# Patient Record
Sex: FEMALE | Race: WHITE | Employment: OTHER | URBAN - METROPOLITAN AREA
[De-identification: names, ages, dates, MRNs, and addresses within clinical notes are randomized per-mention and may not be internally consistent; named-entity substitution may affect disease eponyms.]

---

## 2017-12-12 ENCOUNTER — PAT TELEPHONE (OUTPATIENT)
Dept: PREADMISSION TESTING | Age: 52
End: 2017-12-12

## 2017-12-12 VITALS — BODY MASS INDEX: 21.69 KG/M2 | HEIGHT: 66 IN | WEIGHT: 135 LBS

## 2017-12-12 RX ORDER — LEVOTHYROXINE SODIUM 112 UG/1
112 TABLET ORAL DAILY
COMMUNITY
End: 2020-07-08

## 2017-12-12 RX ORDER — LIOTHYRONINE SODIUM 5 UG/1
5 TABLET ORAL DAILY
COMMUNITY
End: 2020-06-16

## 2017-12-12 RX ORDER — ACETAMINOPHEN 325 MG/1
650 TABLET ORAL EVERY 6 HOURS PRN
COMMUNITY
End: 2021-10-25

## 2017-12-12 RX ORDER — PSEUDOEPHEDRINE HCL 30 MG/5 ML
15 LIQUID (ML) ORAL 4 TIMES DAILY PRN
COMMUNITY
End: 2021-10-25

## 2017-12-12 RX ORDER — FEXOFENADINE HCL 180 MG/1
180 TABLET ORAL DAILY
COMMUNITY
End: 2020-06-16

## 2017-12-12 RX ORDER — TRAMADOL HYDROCHLORIDE 50 MG/1
50 TABLET ORAL EVERY 6 HOURS PRN
COMMUNITY

## 2017-12-19 ENCOUNTER — HOSPITAL ENCOUNTER (OUTPATIENT)
Dept: ENDOSCOPY | Age: 52
Discharge: HOME | End: 2017-12-19
Attending: INTERNAL MEDICINE | Admitting: INTERNAL MEDICINE

## 2017-12-19 VITALS
WEIGHT: 139.13 LBS | OXYGEN SATURATION: 100 % | RESPIRATION RATE: 16 BRPM | SYSTOLIC BLOOD PRESSURE: 125 MMHG | TEMPERATURE: 98.1 F | BODY MASS INDEX: 22.46 KG/M2 | HEART RATE: 77 BPM | DIASTOLIC BLOOD PRESSURE: 74 MMHG

## 2017-12-19 RX ORDER — SODIUM CHLORIDE 0.9 % (FLUSH) 0.9 %
10 SYRINGE (ML) INJECTION EVERY 12 HOURS SCHEDULED
Status: DISCONTINUED | OUTPATIENT
Start: 2017-12-19 | End: 2017-12-20 | Stop reason: HOSPADM

## 2017-12-19 RX ORDER — SODIUM CHLORIDE 9 MG/ML
INJECTION, SOLUTION INTRAVENOUS CONTINUOUS
Status: DISCONTINUED | OUTPATIENT
Start: 2017-12-19 | End: 2017-12-20 | Stop reason: HOSPADM

## 2017-12-19 RX ORDER — SODIUM CHLORIDE 0.9 % (FLUSH) 0.9 %
10 SYRINGE (ML) INJECTION PRN
Status: DISCONTINUED | OUTPATIENT
Start: 2017-12-19 | End: 2017-12-20 | Stop reason: HOSPADM

## 2017-12-19 ASSESSMENT — PAIN - FUNCTIONAL ASSESSMENT: PAIN_FUNCTIONAL_ASSESSMENT: 0-10

## 2017-12-19 ASSESSMENT — LIFESTYLE VARIABLES: SMOKING_STATUS: 0

## 2017-12-19 ASSESSMENT — PAIN SCALES - GENERAL
PAINLEVEL_OUTOF10: 0

## 2017-12-19 ASSESSMENT — ENCOUNTER SYMPTOMS: SHORTNESS OF BREATH: 0

## 2017-12-19 NOTE — ANESTHESIA PRE-OP
[facility] Department of Anesthesiology  Pre-Anesthesia Evaluation/Consultation       Name:  Jeanne Barksdale  : 1965  Age:  50 y.o. MRN:  [id number]  Date: 2017           Procedure (Scheduled):  colonoscopy  Surgeon:  Dr. Odessia Dancer     There is no problem list on file for this patient. Past Medical History:   Diagnosis Date    Asthma     Seasonal allergies     Thyroid disease      Past Surgical History:   Procedure Laterality Date    DILATION AND CURETTAGE OF UTERUS      SINUS SURGERY      X 2    WISDOM TOOTH EXTRACTION       Social History   Substance Use Topics    Smoking status: Never Smoker    Smokeless tobacco: Never Used    Alcohol use No     Medications  Current Outpatient Prescriptions on File Prior to Encounter   Medication Sig Dispense Refill    fexofenadine (ALLEGRA ALLERGY) 180 MG tablet Take 180 mg by mouth daily      pseudoephedrine (SUDAFED) 30 MG/5ML syrup Take 15 mg by mouth 4 times daily as needed      levothyroxine (LEVOXYL) 112 MCG tablet Take 112 mcg by mouth Daily      liothyronine (CYTOMEL) 5 MCG tablet Take 5 mcg by mouth daily      traMADol (ULTRAM) 50 MG tablet Take 50 mg by mouth every 6 hours as needed for Pain .  acetaminophen (TYLENOL) 325 MG tablet Take 650 mg by mouth every 6 hours as needed for Pain       No current facility-administered medications on file prior to encounter. Current Outpatient Prescriptions   Medication Sig Dispense Refill    fexofenadine (ALLEGRA ALLERGY) 180 MG tablet Take 180 mg by mouth daily      pseudoephedrine (SUDAFED) 30 MG/5ML syrup Take 15 mg by mouth 4 times daily as needed      levothyroxine (LEVOXYL) 112 MCG tablet Take 112 mcg by mouth Daily      liothyronine (CYTOMEL) 5 MCG tablet Take 5 mcg by mouth daily      traMADol (ULTRAM) 50 MG tablet Take 50 mg by mouth every 6 hours as needed for Pain .  acetaminophen (TYLENOL) 325 MG tablet Take 650 mg by mouth every 6 hours as needed for Pain       Current Facility-Administered Medications   Medication Dose Route Frequency Provider Last Rate Last Dose    0.9 % sodium chloride infusion   Intravenous Continuous Karen Vance MD        sodium chloride flush 0.9 % injection 10 mL  10 mL Intravenous 2 times per day Karen Vance MD        sodium chloride flush 0.9 % injection 10 mL  10 mL Intravenous PRN Karen Vance MD         Vital Signs (Current)   Vitals:    17   BP: 136/84   Pulse: 82   Resp: 16   Temp: 98.9 °F (37.2 °C)   SpO2: 100%     Vital Signs Statistics (for past 48 hrs)     Temp  Av.9 °F (37.2 °C)  Min: 98.9 °F (37.2 °C)   Min taken time: 17  Max: 98.9 °F (37.2 °C)   Max taken time: 17  Pulse  Av  Min: 82   Min taken time: 17  Max: 82   Max taken time: 17  Resp  Av  Min: 16   Min taken time: 17  Max: 16   Max taken time: 17  BP  Min: 136/84   Min taken time: 17  Max: 136/84   Max taken time: 17  SpO2  Av %  Min: 100 %   Min taken time: 17  Max: 100 %   Max taken time: 17    BP Readings from Last 3 Encounters:   17 136/84     BMI  Body mass index is 22.46 kg/m². Estimated body mass index is 22.46 kg/m² as calculated from the following:    Height as of 17: 5' 6\" (1.676 m). Weight as of this encounter: 139 lb 2 oz (63.1 kg). CBC No results found for: WBC, RBC, HGB, HCT, MCV, RDW, PLT  CMP  No results found for: NA, K, CL, CO2, BUN, CREATININE, GFRAA, AGRATIO, LABGLOM, GLUCOSE, PROT, CALCIUM, BILITOT, ALKPHOS, AST, ALT  BMP  No results found for: NA, K, CL, CO2, BUN, CREATININE, CALCIUM, GFRAA, LABGLOM, GLUCOSE  POCGlucose  No results for input(s): GLUCOSE in the last 72 hours.    Coags  No results found for: PROTIME, INR, APTT  HCG (If Applicable) No results found for: PREGTESTUR, PREGSERUM, HCG, HCGQUANT   ABGs No results found for: PHART, PO2ART, FFU8XZP, QWL2HFC, BEART, G4LLFFRH   Type & Screen (If Applicable)  No results found for: LABABO, LABRH                         BMI: Wt Readings from Last 3 Encounters:       NPO Status:   Date of last liquid consumption: 12/19/17   Time of last liquid consumption: 0750 (sip w meds)   Date of last solid food consumption: 12/17/17      Time of last solid consumption: 0000       Anesthesia Evaluation  Patient summary reviewed and Nursing notes reviewed no history of anesthetic complications:   Airway: Mallampati: II  TM distance: >3 FB   Neck ROM: full  Mouth opening: > = 3 FB Dental: normal exam         Pulmonary:normal exam  breath sounds clear to auscultation  (+) asthma:     (-) pneumonia, COPD, shortness of breath, recent URI, sleep apnea, rhonchi and not a current smoker                           Cardiovascular:  Exercise tolerance: good (>4 METS),       (-) hypertension, past MI, CABG/stent,  angina, orthopnea and murmur      Rhythm: regular  Rate: normal           Beta Blocker:  Not on Beta Blocker         Neuro/Psych:   Negative Neuro/Psych ROS     (-) seizures, neuromuscular disease, TIA, CVA, headaches, psychiatric history and depression/anxiety            GI/Hepatic/Renal:   (+) bowel prep,      (-) hiatal hernia, GERD, PUD, hepatitis, liver disease, no renal disease and no morbid obesity       Endo/Other: Negative Endo/Other ROS   (+) hypothyroidism::., .    (-) hyperthyroidism, blood dyscrasia, arthritis, no Type II DM, no Type I DM               Abdominal:         (-) obese Abdomen: soft. Vascular: negative vascular ROS. - PVD, DVT and PE. Anesthesia Plan      MAC     ASA 2       Induction: intravenous. MIPS: Prophylactic antiemetics administered. Anesthetic plan and risks discussed with patient. Plan discussed with CRNA.                   This pre-anesthesia assessment may be

## 2017-12-19 NOTE — H&P
136/84   Pulse 82   Temp 98.9 °F (37.2 °C) (Temporal)   Resp 16   Wt 139 lb 2 oz (63.1 kg)   SpO2 100%   BMI 22.46 kg/m²  I        Heart: Normal    Lungs: Normal    Abdomen: Normal      ASA Grade: ASA 2 - Patient with mild systemic disease with no functional limitations    II (soft palate, uvula, fauces visible)  ASSESSMENT AND PLAN:    1. Patient is a 50 y.o. female here for Colonoscopy  2. Procedure options, risks and benefits reviewed with patient who expresses understanding.

## 2020-06-16 ENCOUNTER — OFFICE VISIT (OUTPATIENT)
Dept: ENDOCRINOLOGY | Age: 55
End: 2020-06-16
Payer: COMMERCIAL

## 2020-06-16 VITALS
WEIGHT: 144 LBS | RESPIRATION RATE: 16 BRPM | DIASTOLIC BLOOD PRESSURE: 71 MMHG | BODY MASS INDEX: 22.6 KG/M2 | TEMPERATURE: 97.7 F | HEIGHT: 67 IN | HEART RATE: 73 BPM | SYSTOLIC BLOOD PRESSURE: 108 MMHG

## 2020-06-16 PROBLEM — G89.4 CHRONIC PAIN SYNDROME: Status: ACTIVE | Noted: 2020-06-16

## 2020-06-16 PROBLEM — E05.00 GRAVES DISEASE: Status: ACTIVE | Noted: 2020-06-16

## 2020-06-16 PROBLEM — E89.0 POSTABLATIVE HYPOTHYROIDISM: Status: ACTIVE | Noted: 2020-06-16

## 2020-06-16 PROBLEM — Z78.0 POSTMENOPAUSAL: Status: ACTIVE | Noted: 2020-06-16

## 2020-06-16 PROCEDURE — 99203 OFFICE O/P NEW LOW 30 MIN: CPT | Performed by: INTERNAL MEDICINE

## 2020-06-16 NOTE — PROGRESS NOTES
WISDOM TOOTH EXTRACTION       Family History   Problem Relation Age of Onset    High Blood Pressure Mother     Atrial Fibrillation Mother     High Blood Pressure Father     Cancer Father         URETER    Colon Cancer Paternal Grandfather      Social History     Socioeconomic History    Marital status:      Spouse name: None    Number of children: None    Years of education: None    Highest education level: None   Occupational History    None   Social Needs    Financial resource strain: None    Food insecurity     Worry: None     Inability: None    Transportation needs     Medical: None     Non-medical: None   Tobacco Use    Smoking status: Never Smoker    Smokeless tobacco: Never Used   Substance and Sexual Activity    Alcohol use: No    Drug use: Never    Sexual activity: None   Lifestyle    Physical activity     Days per week: None     Minutes per session: None    Stress: None   Relationships    Social connections     Talks on phone: None     Gets together: None     Attends Orthodox service: None     Active member of club or organization: None     Attends meetings of clubs or organizations: None     Relationship status: None    Intimate partner violence     Fear of current or ex partner: None     Emotionally abused: None     Physically abused: None     Forced sexual activity: None   Other Topics Concern    None   Social History Narrative    None     Current Outpatient Medications   Medication Sig Dispense Refill    Cetirizine HCl (ZYRTEC PO) Take by mouth      pseudoephedrine (SUDAFED) 30 MG/5ML syrup Take 15 mg by mouth 4 times daily as needed      levothyroxine (LEVOXYL) 112 MCG tablet Take 112 mcg by mouth Daily      traMADol (ULTRAM) 50 MG tablet Take 50 mg by mouth every 6 hours as needed for Pain .  acetaminophen (TYLENOL) 325 MG tablet Take 650 mg by mouth every 6 hours as needed for Pain       No current facility-administered medications for this visit. Allergies   Allergen Reactions    Nsaids Shortness Of Breath    Triamcinolone Anaphylaxis     Allergic to preservative in [employer]           Review of Systems:  I have reviewed the review of system questionnaire filled by the patient . Patient was advised to contact PCP for non endocrine signs and symptoms       OBJECTIVE:   /71   Pulse 73   Temp 97.7 °F (36.5 °C) (Oral)   Resp 16   Ht 5' 6.5\" (1.689 m)   Wt 144 lb (65.3 kg)   BMI 22.89 kg/m²   Wt Readings from Last 3 Encounters:   06/16/20 144 lb (65.3 kg)   12/19/17 139 lb 2 oz (63.1 kg)   12/12/17 135 lb (61.2 kg)         Physical Exam:  Constitutional: no acute distress, well appearing, well nourished  Psychiatric: oriented to person, place and time, judgement, insight and normal, recent and remote memory and intact and mood, affect are normal  Skin: skin and subcutaneous tissue is normal without mass, normal turgor  Head and Face: examination of head and face revealed no abnormalities  Eyes: no lid or conjunctival swelling, no erythema or discharge, pupils are normal, equal, round. Ears/Nose: external inspection of ears and nose revealed no abnormalities, hearing is grossly normal  Oropharynx/Mouth/Face: lips, tongue and gums are normal with no lesions, the voice quality was normal  Neck: neck is supple and symmetric, with midline trachea and no masses, thyroid is normal  Lymphatics: normal cervical lymph nodes, normal supraclavicular nodes  Pulmonary: no increased work of breathing or signs of respiratory distress, lungs are clear to auscultation  Cardiovascular: normal heart rate and rhythm, normal S1 and S2, no murmurs   Abdomen: abdomen is soft, non-tender with no masses  Musculoskeletal: normal gait and station.   Neurological: normal coordination, normal general cortical function    Lab Review:  No results found for: TSH  No results found for: T4FREE     ASSESSMENT/PLAN:  1. Graves disease status post radioactive iodine ablation at and complications of disease if inadequately treated, side effects of medications, diagnosis, treatment options, and prognosis, risks, benefits, complications, and alternatives of treatment, labs, imaging and other studies and treatment targets and goals. She understands instructions and counseling. Total time spent 40 minutes , more than 50 % if this time was spent on face to face counseling. Return in about 3 months (around 9/16/2020). Please note that some or all of this report was generated using voice recognition software. Please notify me in case of any questions about the content of this document, as some errors in transcription may have occurred .

## 2020-07-02 DIAGNOSIS — E05.00 GRAVES DISEASE: ICD-10-CM

## 2020-07-02 DIAGNOSIS — Z78.0 POSTMENOPAUSAL: ICD-10-CM

## 2020-07-02 LAB
ANTI-THYROGLOB ABS: <10 IU/ML
ESTRADIOL LEVEL: <5 PG/ML
FOLLICLE STIMULATING HORMONE: 82.8 MIU/ML
LUTEINIZING HORMONE: 36.5 MIU/ML
T3 TOTAL: 1.09 NG/ML (ref 0.8–2)
T4 FREE: 1.6 NG/DL (ref 0.9–1.8)
THYROID PEROXIDASE (TPO) ABS: 18 IU/ML
TSH SERPL DL<=0.05 MIU/L-ACNC: 0.09 UIU/ML (ref 0.27–4.2)
VITAMIN D 25-HYDROXY: 21.3 NG/ML

## 2020-07-04 LAB — TSH RECEPTOR AB: <0.9 IU/L

## 2020-07-08 ENCOUNTER — VIRTUAL VISIT (OUTPATIENT)
Dept: ENDOCRINOLOGY | Age: 55
End: 2020-07-08
Payer: COMMERCIAL

## 2020-07-08 PROCEDURE — 99213 OFFICE O/P EST LOW 20 MIN: CPT | Performed by: INTERNAL MEDICINE

## 2020-07-08 RX ORDER — LEVOTHYROXINE SODIUM 100 UG/1
100 TABLET ORAL DAILY
Qty: 90 TABLET | Refills: 1 | Status: SHIPPED | OUTPATIENT
Start: 2020-07-08 | End: 2020-11-24 | Stop reason: SDUPTHER

## 2020-07-08 NOTE — PROGRESS NOTES
Procedure Laterality Date    COLONOSCOPY  12/19/2017    Zander, normal    DILATION AND CURETTAGE OF UTERUS      SINUS SURGERY      X 2    WISDOM TOOTH EXTRACTION       Family History   Problem Relation Age of Onset    High Blood Pressure Mother     Atrial Fibrillation Mother     High Blood Pressure Father     Cancer Father         URETER    Colon Cancer Paternal Grandfather      Social History     Socioeconomic History    Marital status:      Spouse name: None    Number of children: None    Years of education: None    Highest education level: None   Occupational History    None   Social Needs    Financial resource strain: None    Food insecurity     Worry: None     Inability: None    Transportation needs     Medical: None     Non-medical: None   Tobacco Use    Smoking status: Never Smoker    Smokeless tobacco: Never Used   Substance and Sexual Activity    Alcohol use: No    Drug use: Never    Sexual activity: None   Lifestyle    Physical activity     Days per week: None     Minutes per session: None    Stress: None   Relationships    Social connections     Talks on phone: None     Gets together: None     Attends Sabianist service: None     Active member of club or organization: None     Attends meetings of clubs or organizations: None     Relationship status: None    Intimate partner violence     Fear of current or ex partner: None     Emotionally abused: None     Physically abused: None     Forced sexual activity: None   Other Topics Concern    None   Social History Narrative    None     Current Outpatient Medications   Medication Sig Dispense Refill    LEVOXYL 100 MCG tablet Take 1 tablet by mouth Daily 90 tablet 1    Cetirizine HCl (ZYRTEC PO) Take by mouth      pseudoephedrine (SUDAFED) 30 MG/5ML syrup Take 15 mg by mouth 4 times daily as needed      traMADol (ULTRAM) 50 MG tablet Take 50 mg by mouth every 6 hours as needed for Pain .       acetaminophen (TYLENOL) 325 has noted worsening anxiety in the last few years and she is also noted to have a suppressed TSH over the last few readings most recent TSH is suppressed at 0.09 on July 2020, her free T4 was  1.6 but she did not take Levoxyl on the day of blood work  --She is on on 112 mcg of Levoxyl since TSH is suppressed I advised her to reduce the dose to 88 mcg daily she would like to make a smaller change so will reduce the dose 200 mcg Levoxyl daily she will have repeat labs in 2 months and then in 4 month    Side effects from over replacement of thyroid hormone was discussed in great detail with the patient. She was needing thyroid hormone replacement soon after her radioactive iodine ablation. Initially she was started on Levoxyl and was later on switched to [place] [facility] for 2 years and then switch back to Levoxyl and Cytomel. Since December 2019 she is stopped taking the Cytomel is and is only on 112 mcg of Levoxyl. She did not notice significant change in her symptoms after stopping Cytomel and is not interested in resuming it. 3. Chronic pain syndrome  Patient is being treated for chronic pain syndrome, she also carries the diagnosis of myofascial pain issues and follows with pain management physician and is on tramadol    4.  Postmenopausal  According to the patient her last menstrual cycle was at age 47  She notes some symptoms of menopause in the last 1 or 2 years  She has strong family history of osteoporosis including her mom dad and her brother who has celiac disease  I have ordered the following  She still has not done her DEXA scan  Blood work shows postmenopausal status      Reviewed and/or ordered clinical lab results Yes  Reviewed and/or ordered radiology tests Yes   Reviewed and/or ordered other diagnostic tests Yes  Made a decision to obtain old records Yes  Reviewed and summarized old records Yes      Juana Reinoso was counseled regarding symptoms of current diagnosis, course and complications of disease if inadequately treated, side effects of medications, diagnosis, treatment options, and prognosis, risks, benefits, complications, and alternatives of treatment, labs, imaging and other studies and treatment targets and goals. She understands instructions and counseling. This was a phone conversation in [place] of in-person visit due to coronavirus emergency. Patient provided verbal consent for an \"over the phone visit'. Location of patient; home  Total time spent 15 + minutes on this call conducting an interview, collecting data and reviewing her chart, performing a limited exam by phone and educating the patient on my assessment and plan. Return in about 3 months (around 10/8/2020). Please note that some or all of this report was generated using voice recognition software. Please notify me in case of any questions about the content of this document, as some errors in transcription may have occurred .

## 2020-07-13 ENCOUNTER — TELEPHONE (OUTPATIENT)
Dept: ENDOCRINOLOGY | Age: 55
End: 2020-07-13

## 2020-07-13 NOTE — TELEPHONE ENCOUNTER
When calling pt to schedule FU she wanted to know her lab results  for menopause/post-menopause?   CB#[phone]

## 2020-11-19 DIAGNOSIS — Z78.0 POSTMENOPAUSAL: ICD-10-CM

## 2020-11-19 DIAGNOSIS — E05.00 GRAVES DISEASE: ICD-10-CM

## 2020-11-19 DIAGNOSIS — E89.0 POSTABLATIVE HYPOTHYROIDISM: ICD-10-CM

## 2020-11-19 LAB
T4 FREE: 1.3 NG/DL (ref 0.9–1.8)
TSH SERPL DL<=0.05 MIU/L-ACNC: 2.52 UIU/ML (ref 0.27–4.2)

## 2020-11-24 ENCOUNTER — VIRTUAL VISIT (OUTPATIENT)
Dept: ENDOCRINOLOGY | Age: 55
End: 2020-11-24
Payer: COMMERCIAL

## 2020-11-24 PROCEDURE — 99442 PR PHYS/QHP TELEPHONE EVALUATION 11-20 MIN: CPT | Performed by: INTERNAL MEDICINE

## 2020-11-24 RX ORDER — LEVOTHYROXINE SODIUM 100 UG/1
100 TABLET ORAL DAILY
Qty: 90 TABLET | Refills: 1 | Status: SHIPPED | OUTPATIENT
Start: 2020-11-24 | End: 2021-07-26

## 2020-11-24 NOTE — PROGRESS NOTES
SUBJECTIVE:  Katarzyna Cuenca is a 50 y.o. female who is seen here for post ablative hypothyroidism. Patient was diagnosed with Graves' disease and was treated with radioactive iodine in 1986   Patient was diagnosed with Graves  approximately at age 18. Symptoms at presentation were weight loss , goiter and anxiety and tachycardia . She was treated with VERDUZCO ablation and she became hypothyroidism afterwards . She has been on Thyroid hormone replacement for years and previously saw Dr Mejia Sams . She also tried [place] thyroid for a few years and found it hard to adjust medication. she was put back on Levoxyl and she was noted to have low T3 so she was started on Cytomel in 2019 ---she stopped taking it in December     current complaints: fatigue, weight gain, anxiousness  History of obstructive symptoms: difficulty swallowing No, changes in voice/hoarseness No.    History of radiation to patient's neck: NO  Recent iodine exposure: Yes  Family history includes hypothyroidism. Family history of thyroid cancer: No      Patient has been diagnosed with chronic pain syndrome, myofascial muscle pain, degenerative disc disease and osteoarthritis. Patient does follow with pain management group and is on tramadol. She tells me she became post menopausal at age 47 years , her parents had osteoporosis and her brother has celiac disease and osteoporosis too. She has been tested for celiac and she was negative. She doesn't take any calcium supplement.     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    Postablative hypothyroidism 06/16/2020    Chronic pain syndrome 06/16/2020    Postmenopausal 06/16/2020     Past Surgical History:   Procedure Laterality Date    COLONOSCOPY  12/19/2017    Man[facility]old, normal    DILATION AND CURETTAGE OF UTERUS      SINUS SURGERY      X 2    WISDOM TOOTH EXTRACTION       Family History   Problem Relation Age of Onset    High Blood Pressure Mother     Atrial Fibrillation Mother     High Blood Pressure Father     Cancer Father         URETER    Colon Cancer Paternal Grandfather      Social History     Socioeconomic History    Marital status:      Spouse name: None    Number of children: None    Years of education: None    Highest education level: None   Occupational History    None   Social Needs    Financial resource strain: None    Food insecurity     Worry: None     Inability: None    Transportation needs     Medical: None     Non-medical: None   Tobacco Use    Smoking status: Never Smoker    Smokeless tobacco: Never Used   Substance and Sexual Activity    Alcohol use: No    Drug use: Never    Sexual activity: None   Lifestyle    Physical activity     Days per week: None     Minutes per session: None    Stress: None   Relationships    Social connections     Talks on phone: None     Gets together: None     Attends Zoroastrianism service: None     Active member of club or organization: None     Attends meetings of clubs or organizations: None     Relationship status: None    Intimate partner violence     Fear of current or ex partner: None     Emotionally abused: None     Physically abused: None     Forced sexual activity: None   Other Topics Concern    None   Social History Narrative    None     Current Outpatient Medications   Medication Sig Dispense Refill    LEVOXYL 100 MCG tablet Take 1 tablet by mouth Daily 90 tablet 1    Cetirizine HCl (ZYRTEC PO) Take by mouth      pseudoephedrine (SUDAFED) 30 MG/5ML syrup Take 15 mg by mouth 4 times daily as needed      traMADol (ULTRAM) 50 MG tablet Take 50 mg by mouth every 6 hours as needed for Pain Gabbi Carballo acetaminophen (TYLENOL) 325 MG tablet Take 650 mg by mouth every 6 hours as needed for Pain       No current facility-administered medications for this visit. Allergies   Allergen Reactions    Nsaids Shortness Of Breath    Triamcinolone Anaphylaxis     Allergic to preservative in [employer]           Review of Systems:  Constitutional  Patient denies any weight loss denies any weight gain,  Eyes   Pt denies any double vision blurred vision or decreased peripheral vision  Head ear nose throat  Denies any trouble swallowing denies any hoarseness  Denies any shortness of breath denies  Cardiovascular  Denies any chest pain denies any palpitations currently  Gastrointestinal  Denies any nausea ,vomiting ,constipation or diarrhea  Hematological/lymphatic  Denies any blood clot denies or any painful lymph nodes  Genitourinary  Denies any frequent urination denies any nighttime urination  Skin  Denies any acne denies any changes in facial body hair denies any non healing wounds Musculoskeletal   denies any joint or bone pain ,denies any muscle weakness ,denies any new fracture  Endocrine  Denies any excessive thirst denies any excessive heat intolerance or cold intolerance . OBJECTIVE:   There were no vitals taken for this visit. Wt Readings from Last 3 Encounters:   06/16/20 144 lb (65.3 kg)   12/19/17 139 lb 2 oz (63.1 kg)   12/12/17 135 lb (61.2 kg)     Weight 145 lbs   Patient is  alert oriented to time ,place and person. Both recent and remote memory intact .   Patient has weighed themselves in the last few  weeks and it has been stable       Lab Review:  Lab Results   Component Value Date    TSH 2.52 11/19/2020    TSH 0.09 07/02/2020     Lab Results   Component Value Date    T4FREE 1.3 11/19/2020        ASSESSMENT/PLAN:  1. Graves disease status post radioactive iodine ablation at age 25 by Dr. Krys Zepeda  Apparently she did not develop Graves' orbitopathy, she was told that she has goiter and Graves' disease  Patient was advised to have the thyroid function Test done in three months and then again in six months    2. Postablative hypothyroidism  She was needing thyroid hormone replacement soon after her radioactive iodine ablation. Initially she was started on Levoxyl and was later on switched to Post Thyroid for 2 years and then switch back to Levoxyl and Cytomel. Since December 2019 she stopped taking the Cytomel took 112 mcg of Levoxyl. She did not notice significant change in her symptoms after stopping Cytomel and is not interested in resuming it. Patient was noted to have a suppressed TSH in July 2020 and her dose  was reduced to 100 µg daily Most recent TSH 2.5 patient clinical he is doingBetter at this dose  will continue with this  Side effects from over replacement of thyroid hormone was discussed in great detail with the patient. 3. Chronic pain syndrome  Patient is being treated for chronic pain syndrome, she also carries the diagnosis of myofascial pain issues and follows with pain management physician and is on tramadol    4. Postmenopausal  According to the patient her last menstrual cycle was at age 47  She notes some symptoms of menopause in the last 1 or 2 years  She has strong family history of osteoporosis including her mom dad and her brother who has celiac disease  I have ordered the following        Reviewed and/or ordered clinical lab results Yes  Reviewed and/or ordered radiology tests Yes   Reviewed and/or ordered other diagnostic tests Yes  Made a decision to obtain old records Yes  Reviewed and summarized old records Yes      Lacey Diaz was counseled regarding symptoms of current diagnosis, course and complications of disease if inadequately treated, side effects of medications, diagnosis, treatment options, and prognosis, risks, benefits, complications, and alternatives of treatment, labs, imaging and other studies and treatment targets and goals.   She understands instructions and counseling. This was a phone conversation in [place] of in-person visit due to coronavirus emergency. Patient provided verbal consent for an \"over the phone visit'. Location of patient; home  Total time spent  18 min minutes on this call conducting an interview, collecting data and reviewing her chart, performing a limited exam by phone and educating the patient on my assessment and plan. Return in about 6 months (around 5/24/2021). Please note that some or all of this report was generated using voice recognition software. Please notify me in case of any questions about the content of this document, as some errors in transcription may have occurred .

## 2020-11-30 ENCOUNTER — OFFICE VISIT (OUTPATIENT)
Dept: PRIMARY CARE CLINIC | Age: 55
End: 2020-11-30
Payer: COMMERCIAL

## 2020-11-30 PROCEDURE — 99211 OFF/OP EST MAY X REQ PHY/QHP: CPT | Performed by: NURSE PRACTITIONER

## 2020-12-01 LAB — SARS-COV-2: NOT DETECTED

## 2020-12-03 ENCOUNTER — ANESTHESIA EVENT (OUTPATIENT)
Dept: ENDOSCOPY | Age: 55
End: 2020-12-03
Payer: COMMERCIAL

## 2020-12-04 ENCOUNTER — ANESTHESIA (OUTPATIENT)
Dept: ENDOSCOPY | Age: 55
End: 2020-12-04
Payer: COMMERCIAL

## 2020-12-04 ENCOUNTER — HOSPITAL ENCOUNTER (OUTPATIENT)
Age: 55
Setting detail: OUTPATIENT SURGERY
Discharge: HOME | End: 2020-12-04
Attending: INTERNAL MEDICINE | Admitting: INTERNAL MEDICINE
Payer: COMMERCIAL

## 2020-12-04 VITALS
TEMPERATURE: 97.6 F | RESPIRATION RATE: 16 BRPM | WEIGHT: 141 LBS | SYSTOLIC BLOOD PRESSURE: 116 MMHG | BODY MASS INDEX: 22.66 KG/M2 | DIASTOLIC BLOOD PRESSURE: 79 MMHG | HEART RATE: 76 BPM | HEIGHT: 66 IN | OXYGEN SATURATION: 100 %

## 2020-12-04 VITALS — OXYGEN SATURATION: 100 % | DIASTOLIC BLOOD PRESSURE: 69 MMHG | SYSTOLIC BLOOD PRESSURE: 103 MMHG

## 2020-12-04 PROCEDURE — 2580000003 HC RX 258: Performed by: ANESTHESIOLOGY

## 2020-12-04 PROCEDURE — 3609027000 HC COLONOSCOPY: Performed by: INTERNAL MEDICINE

## 2020-12-04 PROCEDURE — 6360000002 HC RX W HCPCS: Performed by: NURSE ANESTHETIST, CERTIFIED REGISTERED

## 2020-12-04 PROCEDURE — 3700000001 HC ADD 15 MINUTES (ANESTHESIA): Performed by: INTERNAL MEDICINE

## 2020-12-04 PROCEDURE — 3700000000 HC ANESTHESIA ATTENDED CARE: Performed by: INTERNAL MEDICINE

## 2020-12-04 PROCEDURE — 7100000011 HC PHASE II RECOVERY - ADDTL 15 MIN: Performed by: INTERNAL MEDICINE

## 2020-12-04 PROCEDURE — 7100000010 HC PHASE II RECOVERY - FIRST 15 MIN: Performed by: INTERNAL MEDICINE

## 2020-12-04 RX ORDER — SODIUM CHLORIDE 0.9 % (FLUSH) 0.9 %
10 SYRINGE (ML) INJECTION EVERY 12 HOURS SCHEDULED
Status: DISCONTINUED | OUTPATIENT
Start: 2020-12-04 | End: 2020-12-04 | Stop reason: HOSPADM

## 2020-12-04 RX ORDER — SODIUM CHLORIDE 9 MG/ML
INJECTION, SOLUTION INTRAVENOUS CONTINUOUS
Status: DISCONTINUED | OUTPATIENT
Start: 2020-12-04 | End: 2020-12-04 | Stop reason: HOSPADM

## 2020-12-04 RX ORDER — PROPOFOL 10 MG/ML
INJECTION, EMULSION INTRAVENOUS PRN
Status: DISCONTINUED | OUTPATIENT
Start: 2020-12-04 | End: 2020-12-04 | Stop reason: SDUPTHER

## 2020-12-04 RX ORDER — PROMETHAZINE HYDROCHLORIDE 25 MG/ML
6.25 INJECTION, SOLUTION INTRAMUSCULAR; INTRAVENOUS
Status: DISCONTINUED | OUTPATIENT
Start: 2020-12-04 | End: 2020-12-04 | Stop reason: HOSPADM

## 2020-12-04 RX ORDER — LABETALOL HYDROCHLORIDE 5 MG/ML
5 INJECTION, SOLUTION INTRAVENOUS EVERY 10 MIN PRN
Status: DISCONTINUED | OUTPATIENT
Start: 2020-12-04 | End: 2020-12-04 | Stop reason: HOSPADM

## 2020-12-04 RX ORDER — SODIUM CHLORIDE 0.9 % (FLUSH) 0.9 %
10 SYRINGE (ML) INJECTION PRN
Status: DISCONTINUED | OUTPATIENT
Start: 2020-12-04 | End: 2020-12-04 | Stop reason: HOSPADM

## 2020-12-04 RX ORDER — ONDANSETRON 2 MG/ML
4 INJECTION INTRAMUSCULAR; INTRAVENOUS
Status: DISCONTINUED | OUTPATIENT
Start: 2020-12-04 | End: 2020-12-04 | Stop reason: HOSPADM

## 2020-12-04 RX ADMIN — PROPOFOL 50 MG: 10 INJECTION, EMULSION INTRAVENOUS at 13:11

## 2020-12-04 RX ADMIN — SODIUM CHLORIDE: 9 INJECTION, SOLUTION INTRAVENOUS at 12:29

## 2020-12-04 RX ADMIN — PROPOFOL 100 MG: 10 INJECTION, EMULSION INTRAVENOUS at 13:07

## 2020-12-04 RX ADMIN — PROPOFOL 50 MG: 10 INJECTION, EMULSION INTRAVENOUS at 13:18

## 2020-12-04 RX ADMIN — PROPOFOL 50 MG: 10 INJECTION, EMULSION INTRAVENOUS at 13:14

## 2020-12-04 RX ADMIN — PROPOFOL 50 MG: 10 INJECTION, EMULSION INTRAVENOUS at 13:09

## 2020-12-04 ASSESSMENT — PAIN SCALES - GENERAL
PAINLEVEL_OUTOF10: 0
PAINLEVEL_OUTOF10: 0

## 2020-12-04 ASSESSMENT — PAIN SCALES - WONG BAKER: WONGBAKER_NUMERICALRESPONSE: 0

## 2020-12-04 ASSESSMENT — PAIN - FUNCTIONAL ASSESSMENT: PAIN_FUNCTIONAL_ASSESSMENT: 0-10

## 2020-12-04 NOTE — H&P
[facility]   Pre-operative History and Physical    Patient: Zahra Hernandez  : 1965  Acct#:         HISTORY OF PRESENT ILLNESS:    The patient is a 50 y.o. female  who presents with probable ischemic colitis  Past Medical History:        Diagnosis Date    Asthma     Seasonal allergies     Thyroid disease      Past Surgical History:        Procedure Laterality Date    COLONOSCOPY  2017    [facility]old, normal    DILATION AND CURETTAGE OF UTERUS      SINUS SURGERY      X 2    WISDOM TOOTH EXTRACTION       Medications prior to admission:   Prior to Admission medications    Medication Sig Start Date End Date Taking? Authorizing Provider   LEVOXYL 100 MCG tablet Take 1 tablet by mouth Daily 20  Yes Brent Parry MD   Cetirizine HCl (ZYRTEC PO) Take by mouth   Yes Historical Provider, MD   traMADol (ULTRAM) 50 MG tablet Take 50 mg by mouth every 6 hours as needed for Pain .    Yes Historical Provider, MD   acetaminophen (TYLENOL) 325 MG tablet Take 650 mg by mouth every 6 hours as needed for Pain   Yes Historical Provider, MD   pseudoephedrine (SUDAFED) 30 MG/5ML syrup Take 15 mg by mouth 4 times daily as needed    Historical Provider, MD     Allergies:    Nsaids and Triamcinolone  Social History:   Social History     Socioeconomic History    Marital status:      Spouse name: Not on file    Number of children: Not on file    Years of education: Not on file    Highest education level: Not on file   Occupational History    Not on file   Social Needs    Financial resource strain: Not on file    Food insecurity     Worry: Not on file     Inability: Not on file   [employer] needs     Medical: Not on file     Non-medical: Not on file   Tobacco Use    Smoking status: Never Smoker    Smokeless tobacco: Never Used   Substance and Sexual Activity    Alcohol use: No    Drug use: Never    Sexual activity: Yes     Partners: Male   Lifestyle    Physical activity     Days per week: Not on file     Minutes per session: Not on file    Stress: Not on file   Relationships    Social connections     Talks on phone: Not on file     Gets together: Not on file     Attends Orthodoxy service: Not on file     Active member of club or organization: Not on file     Attends meetings of clubs or organizations: Not on file     Relationship status: Not on file    Intimate partner violence     Fear of current or ex partner: Not on file     Emotionally abused: Not on file     Physically abused: Not on file     Forced sexual activity: Not on file   Other Topics Concern    Not on file   Social History Narrative    Not on file           Family History:   Family History   Problem Relation Age of Onset    High Blood Pressure Mother     Atrial Fibrillation Mother     High Blood Pressure Father     Cancer Father         URETER    Colon Cancer Paternal Grandfather          PHYSICAL EXAM:      /76   Pulse 90   Temp 98.4 °F (36.9 °C) (Temporal)   Resp 16   Ht 5' 6\" (1.676 m)   Wt 141 lb (64 kg)   SpO2 100%   BMI 22.76 kg/m²  I        Heart: Normal    Lungs: Normal    Abdomen: Normal      ASA Grade: ASA 2 - Patient with mild systemic disease with no functional limitations    III (soft palate, base of uvula visible)  ASSESSMENT AND PLAN:    1. Patient is a 50 y.o. female here for Colonoscopy  2. Procedure options, risks and benefits reviewed with patient who expresses understanding.

## 2020-12-04 NOTE — BRIEF OP NOTE
Brief Postoperative Note    Kameron Castro  YOB: 1965  [id number]      Pre-operative Diagnosis: Ischemic colitis    Previous Colonoscopy: Yes  When: 12/19/17  Greater than 3 years? No      Post-operative Diagnosis: Same    Procedure: Colonoscopy    The preparation was good.     Anesthesia: MAC    Surgeons/Assistants: Blanco Stout MD    Estimated Blood Loss: None    Complications: None    Specimens: Was Not Obtained    Findings: See dictated report    Electronically signed by Blanco Stout MD on 7/10/2017 at 7:45 AM

## 2020-12-04 NOTE — ANESTHESIA PRE PROCEDURE
[facility] Department of Anesthesiology  Pre-Anesthesia Evaluation/Consultation       Name:  Ryland Cerrato  : 1965  Age:  50 y.o. MRN:  [id number]  Date: 2020           Surgeon: Surgeon(s):  Libra Wiley MD    Procedure: Procedure(s):  COLONOSCOPY DIAGNOSTIC     Allergies   Allergen Reactions    Nsaids Shortness Of Breath    Triamcinolone Anaphylaxis     Allergic to preservative in [place]       Patient Active Problem List   Diagnosis    Graves disease    Postablative hypothyroidism    Chronic pain syndrome    Postmenopausal     Past Medical History:   Diagnosis Date    Asthma     Seasonal allergies     Thyroid disease      Past Surgical History:   Procedure Laterality Date    COLONOSCOPY  2017    Manegold, normal    DILATION AND CURETTAGE OF UTERUS      SINUS SURGERY      X 2    WISDOM TOOTH EXTRACTION       Social History     Tobacco Use    Smoking status: Never Smoker    Smokeless tobacco: Never Used   Substance Use Topics    Alcohol use: No    Drug use: Never     Medications  No current facility-administered medications on file prior to encounter. Current Outpatient Medications on File Prior to Encounter   Medication Sig Dispense Refill    Cetirizine HCl (ZYRTEC PO) Take by mouth      traMADol (ULTRAM) 50 MG tablet Take 50 mg by mouth every 6 hours as needed for Pain .       acetaminophen (TYLENOL) 325 MG tablet Take 650 mg by mouth every 6 hours as needed for Pain      pseudoephedrine (SUDAFED) 30 MG/5ML syrup Take 15 mg by mouth 4 times daily as needed       Current Facility-Administered Medications   Medication Dose Route Frequency Provider Last Rate Last Dose    0.9 % sodium chloride infusion   Intravenous Continuous Donnie Ortiz  mL/hr at 20 1229      sodium chloride flush 0.9 % injection 10 mL  10 mL Intravenous 2 times per day Donnie Ortiz MD        sodium chloride flush 0.9 % injection 10 mL  10 mL Intravenous PRN Tianna Charlton MD         Vital Signs (Current)   Vitals:    20 1225   BP: 110/76   Pulse: 90   Resp: 16   Temp: 98.4 °F (36.9 °C)   SpO2: 100%     Vital Signs Statistics (for past 48 hrs)     Temp  Av.4 °F (36.9 °C)  Min: 98.4 °F (36.9 °C)   Min taken time: 20 1225  Max: 98.4 °F (36.9 °C)   Max taken time: 20 1225  Pulse  Av  Min: 90   Min taken time: 20 1225  Max: 90   Max taken time: 20 1225  Resp  Av  Min: 16   Min taken time: 20 1225  Max: 16   Max taken time: 20 1225  BP  Min: 110/76   Min taken time: 20 1225  Max: 110/76   Max taken time: 20 1225  SpO2  Av %  Min: 100 %   Min taken time: 20 1225  Max: 100 %   Max taken time: 20 1225    BP Readings from Last 3 Encounters:   20 110/76   20 108/71   17 125/74     BMI  Body mass index is 22.76 kg/m². Estimated body mass index is 22.76 kg/m² as calculated from the following:    Height as of this encounter: 5' 6\" (1.676 m). Weight as of this encounter: 141 lb (64 kg). CBC No results found for: WBC, RBC, HGB, HCT, MCV, RDW, PLT  CMP  No results found for: NA, K, CL, CO2, BUN, CREATININE, GFRAA, AGRATIO, LABGLOM, GLUCOSE, PROT, CALCIUM, BILITOT, ALKPHOS, AST, ALT  BMP  No results found for: NA, K, CL, CO2, BUN, CREATININE, CALCIUM, GFRAA, LABGLOM, GLUCOSE  POCGlucose  No results for input(s): GLUCOSE in the last 72 hours.    Coags  No results found for: PROTIME, INR, APTT  HCG (If Applicable) No results found for: PREGTESTUR, PREGSERUM, HCG, HCGQUANT   ABGs No results found for: PHART, PO2ART, JEK2CED, DDP7IQN, BEART, C4CGTPWV   Type & Screen (If Applicable)  No results found for: LABABO, LABRH                         BMI: Wt Readings from Last 3 Encounters:       NPO Status:   Date of last liquid consumption: 20   Time of last liquid consumption: 0930   Date of last solid food consumption: 20      Time of last solid

## 2020-12-04 NOTE — OP NOTE
Operative Note      Patient: David Agustin  YOB: 1965  MRN: [id number]    Date of Procedure: 12/4/2020    Pre-Op Diagnosis: RECTAL BLEED, LEFT LOWER QUADRANT PAIN    Post-Op Diagnosis: Same       Procedure(s):  COLONOSCOPY DIAGNOSTIC    Surgeon(s):  Heydi Mata MD    Assistant:   * No surgical staff found *    Anesthesia: Monitor Anesthesia Care    Estimated Blood Loss (mL): None    Complications: None    Specimens:   * No specimens in log *    Implants:  * No implants in log *      Drains: * No LDAs found *    Findings: See dictated report    Detailed Description of Procedure:   See dictated report    Electronically signed by Heydi Mata MD on 12/4/2020 at 1:36 PM

## 2021-09-02 ENCOUNTER — TELEPHONE (OUTPATIENT)
Dept: ENDOCRINOLOGY | Age: 56
End: 2021-09-02

## 2021-09-02 DIAGNOSIS — E89.0 POSTABLATIVE HYPOTHYROIDISM: Primary | ICD-10-CM

## 2021-10-14 ENCOUNTER — TELEPHONE (OUTPATIENT)
Dept: ENDOCRINOLOGY | Age: 56
End: 2021-10-14

## 2021-10-18 NOTE — TELEPHONE ENCOUNTER
Advise patient that her DEXA scan shows that she has osteoporosis and since it is a new diagnosis I would like her to start taking medication for that. I can set up a virtual visit or  in office visit to go over the results as she might have questions about diagnosis and treatment options available.   we can double book in a  Monday clinic as there are usually cancellations or no-shows

## 2021-10-23 DIAGNOSIS — E89.0 POSTABLATIVE HYPOTHYROIDISM: ICD-10-CM

## 2021-10-24 LAB
T4 FREE: 1.1 NG/DL (ref 0.9–1.8)
TSH SERPL DL<=0.05 MIU/L-ACNC: 14.4 UIU/ML (ref 0.27–4.2)

## 2021-10-25 ENCOUNTER — VIRTUAL VISIT (OUTPATIENT)
Dept: ENDOCRINOLOGY | Age: 56
End: 2021-10-25
Payer: COMMERCIAL

## 2021-10-25 DIAGNOSIS — G89.4 CHRONIC PAIN SYNDROME: ICD-10-CM

## 2021-10-25 DIAGNOSIS — E89.0 POSTABLATIVE HYPOTHYROIDISM: Primary | ICD-10-CM

## 2021-10-25 DIAGNOSIS — M81.0 AGE-RELATED OSTEOPOROSIS WITHOUT CURRENT PATHOLOGICAL FRACTURE: ICD-10-CM

## 2021-10-25 DIAGNOSIS — Z78.0 POSTMENOPAUSAL: ICD-10-CM

## 2021-10-25 PROCEDURE — 99443 PR PHYS/QHP TELEPHONE EVALUATION 21-30 MIN: CPT | Performed by: INTERNAL MEDICINE

## 2021-10-25 RX ORDER — ALENDRONATE SODIUM 70 MG/1
70 TABLET ORAL
Qty: 4 TABLET | Refills: 3 | Status: SHIPPED | OUTPATIENT
Start: 2021-10-25 | End: 2022-02-21

## 2021-10-25 RX ORDER — LEVOTHYROXINE SODIUM 112 UG/1
112 TABLET ORAL DAILY
Qty: 30 TABLET | Refills: 3 | Status: SHIPPED | OUTPATIENT
Start: 2021-10-25 | End: 2022-02-24

## 2021-10-25 RX ORDER — LEVOTHYROXINE SODIUM 100 UG/1
TABLET ORAL
Qty: 90 TABLET | Refills: 1 | Status: SHIPPED | OUTPATIENT
Start: 2021-10-25 | End: 2021-10-25

## 2021-10-25 NOTE — PATIENT INSTRUCTIONS
Patient Education        alendronate  Pronunciation:  ronald NOLASCO marvin ashley  Brand:  Binosto, Fosamax  What is the most important information I should know about alendronate? You should not take alendronate if you have problems with your esophagus, or low levels of calcium in your blood. Do not take alendronate if you cannot sit upright or stand for at least 30 minutes after taking the medicine. Alendronate can cause serious problems in the stomach or esophagus. Stop using alendronate and call your doctor at once if you have chest pain, new or worsening heartburn, or pain when swallowing. Also call your doctor if you have muscle spasms, numbness or tingling (in hands and feet or around the mouth), new or unusual hip pain, or severe pain in your joints, bones, or muscles. What is alendronate? Alendronate is used to treat osteoporosis caused by menopause, steroid use, or gonadal failure. This medicine is for use when you have a high risk of bone fracture due to osteoporosis. Alendronate is also used to treat Paget's disease of bone. Alendronate may also be used for purposes not listed in this medication guide. What should I discuss with my healthcare provider before taking alendronate? You should not take alendronate if you are allergic to it, or if you have:  · low levels of calcium in your blood (hypocalcemia); or  · problems with the muscles in your esophagus (the tube that connects your mouth and stomach). Do not take alendronate if you cannot sit upright or stand for at least 30 minutes. Alendronate can cause serious problems in the stomach or esophagus. You must stay upright for at least 30 minutes after taking this medicine.   Tell your doctor if you have ever had:  · trouble swallowing;  · problems with your stomach or digestion;  · hypocalcemia;  · a dental problem (you may need a dental exam before you begin taking alendronate);  · kidney disease; or  · any condition that makes it hard for your body to absorb nutrients from food (malabsorption). The effervescent tablet contains a lot of sodium. Tell your doctor if you are on a low-salt diet before using this form of alendronate. This medicine may cause jaw bone problems (osteonecrosis). The risk is highest in people with cancer, blood cell disorders, pre-existing dental problems, or people treated with steroids, chemotherapy, or radiation. Ask your doctor about your own risk. It is not known whether this medicine will harm an unborn baby. Tell your doctor if you are pregnant or trying to become pregnant. Stop using the medicine and tell your doctor right away if you become pregnant. It may not be safe to breastfeed while using this medicine. Ask your doctor about any risk. How should I take alendronate? Follow all directions on your prescription label and read all medication guides or instruction sheets. Use the medicine exactly as directed. Alendronate is taken either once daily or once per week. Follow your doctor's dosing instructions very carefully. Take alendronate first thing in the morning, at least 30 minutes before you eat or drink anything or take any other medicine. If you take alendronate only once per week, take it on the same day each week and always first thing in the morning. Take with a full glass (6 to 8 ounces) of plain water. Do not use coffee, tea, soda, juice, or mineral water. Do not eat or drink anything other than plain water. Measure liquid medicine carefully. Use the dosing syringe provided, or use a medicine dose-measuring device (not a kitchen spoon). Do not crush, chew, or suck on an alendronate regular tablet. Swallow it whole. Dissolve the effervescent tablet in at least 4 ounces of water (at room temperature, not hot or cold). Let the tablet dissolve for 5 minutes. Stir this mixture for 10 seconds and drink all of it right away. Add a little more water to the glass, swirl gently and drink right away.   For at least 30 minutes after taking alendronate:   · Do not lie down or recline. · Do not take any other medicine including vitamins, calcium, or antacids. Pay special attention to your dental hygiene while taking alendronate. Brush and floss your teeth regularly. If you need to have any dental work (especially surgery), tell the dentist ahead of time that you are using alendronate. Alendronate is only part of a complete program of treatment that may also include diet changes, exercise, bone mineral density testing, and taking calcium and vitamin supplements. Follow your doctor's instructions very closely. Store at room temperature away from moisture and heat. Keep unused effervescent tablets in the foil blister pack. Your doctor will determine how long to treat you with this medicine. Alendronate is often given for only 3 to 5 years. What happens if I miss a dose? Once-daily dosing: If you forget to take alendronate first thing in the morning, do not take it later in the day. Wait until the following morning and skip the missed dose. Do not take two (2) doses in one day. Once-per-week dosing: If you forget to take alendronate on your scheduled day, take it first thing in the morning on the day after you remember the missed dose. Then return to your regular weekly schedule on your chosen dose day. Do not take 2 doses in one day. What happens if I overdose? Drink a full glass of milk and seek emergency medical attention or call the Poison Help line at 1-465.326.9819. Do not make yourself vomit and do not lie down. What should I avoid while taking alendronate? Avoid taking any other medicines for at least 30 minutes after taking alendronate. This includes vitamins, calcium, and antacids. Some medicines can make it harder for your body to absorb alendronate. Avoid smoking, or try to quit. Smoking can reduce your bone mineral density, making fractures more likely. Avoid drinking large amounts of alcohol.  Heavy drinking can also cause bone loss. What are the possible side effects of alendronate? Get emergency medical help if you have signs of an allergic reaction: hives; wheezing, difficulty breathing; swelling of your face, lips, tongue, or throat. Stop using alendronate and call your doctor at once if you have:  · chest pain, new or worsening heartburn;  · difficulty or pain when swallowing;  · pain or burning under the ribs or in the back;  · severe heartburn, burning pain in your upper stomach, or coughing up blood;  · new or unusual pain in your thigh or hip;  · jaw pain, numbness, or swelling;  · severe joint, bone, or muscle pain; or  · low calcium levels --muscle spasms or contractions, numbness or tingly feeling (around your mouth, or in your fingers and toes). Common side effects may include:  · heartburn, upset stomach;  · stomach pain, nausea;  · diarrhea, constipation; or  · bone pain, muscle or joint pain. This is not a complete list of side effects and others may occur. Call your doctor for medical advice about side effects. You may report side effects to FDA at 3-102-FDA-9835. What other drugs will affect alendronate? Tell your doctor about all your other medicines, especially:  · aspirin; or  · NSAIDs (nonsteroidal anti-inflammatory drugs) --ibuprofen (Advil, Motrin), naproxen (Aleve), celecoxib, diclofenac, indomethacin, meloxicam, and others. This list is not complete. Other drugs may affect alendronate, including prescription and over-the-counter medicines, vitamins, and herbal products. Not all possible drug interactions are listed here. Where can I get more information? Your pharmacist can provide more information about alendronate. Remember, keep this and all other medicines out of the reach of children, never share your medicines with others, and use this medication only for the indication prescribed.    Every effort has been made to ensure that the information provided by [address] [place] [address] Dr MORENO ('[employer]') is accurate, up-to-date, and complete, but no guarantee is made to that effect. Drug information contained herein may be time sensitive. [employer] information has been compiled for use by healthcare practitioners and consumers in the [place] and therefore [employer] does not warrant that uses outside of the [place] are appropriate, unless specifically indicated otherwise. [employer]'s drug information does not endorse drugs, diagnose patients or recommend therapy. [employer]'s drug information is an informational resource designed to assist licensed healthcare practitioners in caring for their patients and/or to serve consumers viewing this service as a supplement to, and not a substitute for, the expertise, skill, knowledge and judgment of healthcare practitioners. The absence of a warning for a given drug or drug combination in no way should be construed to indicate that the drug or drug combination is safe, effective or appropriate for any given patient. [facility] does not assume any responsibility for any aspect of healthcare administered with the aid of information [employer] provides. The information contained herein is not intended to cover all possible uses, directions, precautions, warnings, drug interactions, allergic reactions, or adverse effects. If you have questions about the drugs you are taking, check with your doctor, nurse or pharmacist.  Copyright 8646-1546 [address]: 15.01. Revision date: 10/19/2020. Care instructions adapted under license by [facility] ([facility]). If you have questions about a medical condition or this instruction, always ask your healthcare professional. [place] [address] any warranty or liability for your use of this information.

## 2021-10-25 NOTE — PROGRESS NOTES
SUBJECTIVE:  Jose Benavidez is a 49 y.o. female who is seen here for post ablative hypothyroidism. Patient was diagnosed with Graves' disease and was treated with radioactive iodine in 1986   Patient was diagnosed with Graves  approximately at age 18. Symptoms at presentation were weight loss , goiter and anxiety and tachycardia . She was treated with VERDUZCO ablation and she became hypothyroidism afterwards . She has been on Thyroid hormone replacement for years and previously saw Dr Lakesha Richter . She also tried [place] thyroid for a few years and found it hard to adjust medication. she was put back on Levoxyl and she was noted to have low T3 so she was started on Cytomel in 2019 ---she stopped taking it in December     current complaints: fatigue, weight gain, anxiousness  History of obstructive symptoms: difficulty swallowing No, changes in voice/hoarseness No.    History of radiation to patient's neck: NO  Recent iodine exposure: Yes  Family history includes hypothyroidism. Family history of thyroid cancer: No      Patient has been diagnosed with chronic pain syndrome, myofascial muscle pain, degenerative disc disease and osteoarthritis. Patient does follow with pain management group and is on tramadol. She tells me she became post menopausal at age 47 years , her parents had osteoporosis and her brother has celiac disease and osteoporosis too. She has been tested for celiac and she was negative. She doesn't take any calcium supplement.     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    Postablative hypothyroidism 06/16/2020    Chronic pain syndrome 06/16/2020  Postmenopausal 06/16/2020     Past Surgical History:   Procedure Laterality Date    COLONOSCOPY  12/19/2017    Zander, normal    COLONOSCOPY N/A 12/4/2020    COLONOSCOPY DIAGNOSTIC performed by [facility] at [facility]      SINUS SURGERY      X 2    WISDOM TOOTH EXTRACTION       Family History   Problem Relation Age of Onset    High Blood Pressure Mother     Atrial Fibrillation Mother     High Blood Pressure Father     Cancer Father         URETER    Colon Cancer Paternal Grandfather      Social History     Socioeconomic History    Marital status:      Spouse name: None    Number of children: None    Years of education: None    Highest education level: None   Occupational History    None   Tobacco Use    Smoking status: Never Smoker    Smokeless tobacco: Never Used   Vaping Use    Vaping Use: Never used   Substance and Sexual Activity    Alcohol use: No    Drug use: Never    Sexual activity: Yes     Partners: Male   Other Topics Concern    None   Social History Narrative    None     Social Determinants of Health     Financial Resource Strain:     Difficulty of Paying Living Expenses:    Food Insecurity:     Worried About Running Out of Food in the Last Year:     Ran Out of Food in the Last Year:    Transportation Needs:     Lack of Transportation (Medical):      Lack of Transportation (Non-Medical):    Physical Activity:     Days of Exercise per Week:     Minutes of Exercise per Session:    Stress:     Feeling of Stress :    Social Connections:     Frequency of Communication with Friends and Family:     Frequency of Social Gatherings with Friends and Family:     Attends Baptism Services:     Active Member of Clubs or Organizations:     Attends Club or Organization Meetings:     Marital Status:    Intimate Partner Violence:     Fear of Current or Ex-Partner:     Emotionally Abused:     Physically Abused:     Sexually Abused:      Current Outpatient Medications   Medication Sig Dispense Refill    alendronate (FOSAMAX) 70 MG tablet Take 1 tablet by mouth every 7 days Take once per week in the morning with a full glass of water, on an empty stomach, and do not take anything else by mouth or lie down for the next 30 minutes. 4 tablet 3    LEVOXYL 112 MCG tablet Take 1 tablet by mouth Daily 30 tablet 3    Cetirizine HCl (ZYRTEC PO) Take by mouth      traMADol (ULTRAM) 50 MG tablet Take 50 mg by mouth every 6 hours as needed for Pain . No current facility-administered medications for this visit. Allergies   Allergen Reactions    Nsaids Shortness Of Breath    Triamcinolone Anaphylaxis     Allergic to preservative in [employer]             OBJECTIVE:   There were no vitals taken for this visit. Wt Readings from Last 3 Encounters:   12/04/20 141 lb (64 kg)   06/16/20 144 lb (65.3 kg)   12/19/17 139 lb 2 oz (63.1 kg)     Weight 145 lbs   Patient is  alert oriented to time ,place and person. Both recent and remote memory intact .   Patient has weighed themselves in the last few  weeks and it has been stable       Lab Review:  Lab Results   Component Value Date    TSH 14.40 10/23/2021    TSH 2.52 11/19/2020    TSH 0.09 07/02/2020     Lab Results   Component Value Date    T4FREE 1.1 10/23/2021        ASSESSMENT/PLAN:    Osteoporosis new diagnosis   dexa scan oct 2021 T score LS -2.4 left hip -2.9   She had menopause at age 30 premature   She was advised to optimize her calcium to 1200 mg    Fosamax 70 mg will be started , handout sent to the patient    --- Graves disease status post radioactive iodine ablation at age 25 by Dr. Florin Rojo  Apparently she did not develop Graves' orbitopathy, she was told that she has goiter and Graves' disease  Patient was advised to have the thyroid function Test done in three months and then again in six months    --- Postablative hypothyroidism    Patient was noted to have a suppressed TSH in July 2020 and her dose  was reduced to 100 µg daily now TSH is 14, will change the dose back to 112 mcg daily and she will repeat the labs in 2 months    She was needing thyroid hormone replacement soon after her radioactive iodine ablation. Initially she was started on Levoxyl and was later on switched to [place] Thyroid for 2 years and then switch back to Levoxyl and Cytomel. Since December 2019 she stopped taking the Cytomel took 112 mcg of Levoxyl. She did not notice significant change in her symptoms after stopping Cytomel and is not interested in resuming it. --- Chronic pain syndrome  Patient is being treated for chronic pain syndrome, she also carries the diagnosis of myofascial pain issues and follows with pain management physician and is on tramadol    ---- Postmenopausal  According to the patient her last menstrual cycle was at age 47  She notes some symptoms of menopause in the last 1 or 2 years  She has strong family history of osteoporosis including her mom dad and her brother who has celiac disease  I have ordered the following        Reviewed and/or ordered clinical lab results Yes  Reviewed and/or ordered radiology tests Yes   Reviewed and/or ordered other diagnostic tests Yes  Made a decision to obtain old records Yes  Reviewed and summarized old records Yes      Andre Hanna was counseled regarding symptoms of current diagnosis, course and complications of disease if inadequately treated, side effects of medications, diagnosis, treatment options, and prognosis, risks, benefits, complications, and alternatives of treatment, labs, imaging and other studies and treatment targets and goals. She understands instructions and counseling. This was a phone conversation in [place] of in-person visit due to coronavirus emergency. Patient provided verbal consent for an \"over the phone visit'.   Location of patient; home  Total time spent  22 min minutes on this call conducting an interview, collecting data and reviewing her chart, performing a limited exam by phone and educating the patient on my assessment and plan. Return in about 3 months (around 1/25/2022). Please note that some or all of this report was generated using voice recognition software. Please notify me in case of any questions about the content of this document, as some errors in transcription may have occurred .

## 2022-02-20 DIAGNOSIS — M81.0 AGE-RELATED OSTEOPOROSIS WITHOUT CURRENT PATHOLOGICAL FRACTURE: Primary | ICD-10-CM

## 2022-02-21 RX ORDER — ALENDRONATE SODIUM 70 MG/1
TABLET ORAL
Qty: 4 TABLET | Refills: 3 | Status: SHIPPED | OUTPATIENT
Start: 2022-02-21 | End: 2022-10-24

## 2022-02-24 DIAGNOSIS — E89.0 POSTABLATIVE HYPOTHYROIDISM: Primary | ICD-10-CM

## 2022-02-24 RX ORDER — LEVOTHYROXINE SODIUM 112 UG/1
TABLET ORAL
Qty: 30 TABLET | Refills: 3 | Status: SHIPPED | OUTPATIENT
Start: 2022-02-24 | End: 2022-08-04 | Stop reason: SDUPTHER

## 2022-07-13 DIAGNOSIS — M81.0 AGE-RELATED OSTEOPOROSIS WITHOUT CURRENT PATHOLOGICAL FRACTURE: ICD-10-CM

## 2022-07-13 RX ORDER — ALENDRONATE SODIUM 70 MG/1
TABLET ORAL
Qty: 4 TABLET | Refills: 3 | OUTPATIENT
Start: 2022-07-13

## 2022-07-27 DIAGNOSIS — E89.0 POSTABLATIVE HYPOTHYROIDISM: ICD-10-CM

## 2022-07-27 RX ORDER — LEVOTHYROXINE SODIUM 112 UG/1
TABLET ORAL
Qty: 30 TABLET | Refills: 3 | OUTPATIENT
Start: 2022-07-27

## 2022-08-04 ENCOUNTER — TELEPHONE (OUTPATIENT)
Dept: ENDOCRINOLOGY | Age: 57
End: 2022-08-04

## 2022-08-04 DIAGNOSIS — E89.0 POSTABLATIVE HYPOTHYROIDISM: ICD-10-CM

## 2022-08-04 RX ORDER — LEVOTHYROXINE SODIUM 112 UG/1
TABLET ORAL
Qty: 30 TABLET | Refills: 5 | Status: SHIPPED | OUTPATIENT
Start: 2022-08-04

## 2022-08-04 NOTE — TELEPHONE ENCOUNTER
Pt requesting refill   LEVOXYL 112 MCG tablet       [facility] [id number] Beny Durham [address] [place] [address] [phone]   [address] [place] [address]   Phone:  [phone]  Fax:  [phone]

## 2022-10-24 DIAGNOSIS — M81.0 AGE-RELATED OSTEOPOROSIS WITHOUT CURRENT PATHOLOGICAL FRACTURE: ICD-10-CM

## 2022-10-24 RX ORDER — ALENDRONATE SODIUM 70 MG/1
TABLET ORAL
Qty: 4 TABLET | Refills: 2 | Status: SHIPPED | OUTPATIENT
Start: 2022-10-24

## 2022-11-02 ENCOUNTER — TELEPHONE (OUTPATIENT)
Dept: ENDOCRINOLOGY | Age: 57
End: 2022-11-02

## 2022-11-02 DIAGNOSIS — E89.0 POSTABLATIVE HYPOTHYROIDISM: Primary | ICD-10-CM

## 2022-11-04 RX ORDER — LEVOTHYROXINE SODIUM 112 UG/1
112 TABLET ORAL DAILY
Qty: 30 TABLET | Refills: 3 | Status: SHIPPED | OUTPATIENT
Start: 2022-11-04

## 2022-11-04 NOTE — TELEPHONE ENCOUNTER
Please let the patient know that we can either switch her to brand synthroid 112 mcg or genereic Levothyroxine 112 mcg as her medicine is on back order

## 2022-11-04 NOTE — TELEPHONE ENCOUNTER
Spoke to patient and she is okay with using the generic levothyroxine until they get the Levoxyl back in.

## 2023-01-06 DIAGNOSIS — E89.0 POSTABLATIVE HYPOTHYROIDISM: ICD-10-CM

## 2023-01-06 DIAGNOSIS — E89.0 POSTABLATIVE HYPOTHYROIDISM: Primary | ICD-10-CM

## 2023-01-06 DIAGNOSIS — M81.0 AGE-RELATED OSTEOPOROSIS WITHOUT CURRENT PATHOLOGICAL FRACTURE: ICD-10-CM

## 2023-01-06 LAB
A/G RATIO: 1.8 (ref 1.1–2.2)
ALBUMIN SERPL-MCNC: 4.3 G/DL (ref 3.4–5)
ALP BLD-CCNC: 93 U/L (ref 40–129)
ALT SERPL-CCNC: 17 U/L (ref 10–40)
ANION GAP SERPL CALCULATED.3IONS-SCNC: 10 MMOL/L (ref 3–16)
AST SERPL-CCNC: 24 U/L (ref 15–37)
BILIRUB SERPL-MCNC: 0.4 MG/DL (ref 0–1)
BUN BLDV-MCNC: 12 MG/DL (ref 7–20)
CALCIUM SERPL-MCNC: 9.7 MG/DL (ref 8.3–10.6)
CHLORIDE BLD-SCNC: 104 MMOL/L (ref 99–110)
CO2: 28 MMOL/L (ref 21–32)
CREAT SERPL-MCNC: 0.8 MG/DL (ref 0.6–1.1)
GFR SERPL CREATININE-BSD FRML MDRD: >60 ML/MIN/{1.73_M2}
GLUCOSE BLD-MCNC: 71 MG/DL (ref 70–99)
POTASSIUM SERPL-SCNC: 4.1 MMOL/L (ref 3.5–5.1)
SODIUM BLD-SCNC: 142 MMOL/L (ref 136–145)
TOTAL PROTEIN: 6.7 G/DL (ref 6.4–8.2)

## 2023-01-07 LAB
T4 FREE: 1.3 NG/DL (ref 0.9–1.8)
TSH SERPL DL<=0.05 MIU/L-ACNC: 0.73 UIU/ML (ref 0.27–4.2)
VITAMIN D 25-HYDROXY: 11.9 NG/ML

## 2023-01-16 DIAGNOSIS — M81.0 AGE-RELATED OSTEOPOROSIS WITHOUT CURRENT PATHOLOGICAL FRACTURE: ICD-10-CM

## 2023-01-16 RX ORDER — ALENDRONATE SODIUM 70 MG/1
TABLET ORAL
Qty: 4 TABLET | Refills: 2 | Status: SHIPPED | OUTPATIENT
Start: 2023-01-16

## 2023-01-19 ENCOUNTER — OFFICE VISIT (OUTPATIENT)
Dept: ENDOCRINOLOGY | Age: 58
End: 2023-01-19
Payer: COMMERCIAL

## 2023-01-19 VITALS
DIASTOLIC BLOOD PRESSURE: 78 MMHG | WEIGHT: 153 LBS | BODY MASS INDEX: 24.59 KG/M2 | SYSTOLIC BLOOD PRESSURE: 117 MMHG | HEART RATE: 97 BPM | HEIGHT: 66 IN | RESPIRATION RATE: 16 BRPM

## 2023-01-19 DIAGNOSIS — E89.0 POSTABLATIVE HYPOTHYROIDISM: Primary | ICD-10-CM

## 2023-01-19 DIAGNOSIS — M81.0 AGE-RELATED OSTEOPOROSIS WITHOUT CURRENT PATHOLOGICAL FRACTURE: ICD-10-CM

## 2023-01-19 DIAGNOSIS — E05.00 GRAVES DISEASE: ICD-10-CM

## 2023-01-19 DIAGNOSIS — G89.4 CHRONIC PAIN SYNDROME: ICD-10-CM

## 2023-01-19 DIAGNOSIS — E55.9 VITAMIN D DEFICIENCY: ICD-10-CM

## 2023-01-19 PROCEDURE — 99214 OFFICE O/P EST MOD 30 MIN: CPT | Performed by: INTERNAL MEDICINE

## 2023-01-19 RX ORDER — LEVOTHYROXINE SODIUM 112 UG/1
TABLET ORAL
Qty: 90 TABLET | Refills: 1 | Status: SHIPPED | OUTPATIENT
Start: 2023-01-19

## 2023-01-19 RX ORDER — ERGOCALCIFEROL 1.25 MG/1
50000 CAPSULE ORAL WEEKLY
Qty: 12 CAPSULE | Refills: 1 | Status: SHIPPED | OUTPATIENT
Start: 2023-01-19

## 2023-01-19 NOTE — PROGRESS NOTES
SUBJECTIVE:  Meenakshi Rivera is a 56 y.o. female who is seen here for post ablative hypothyroidism. Patient was diagnosed with Graves' disease and was treated with radioactive iodine in 1986   Patient was diagnosed with Graves  approximately at age 18. Symptoms at presentation were weight loss , goiter and anxiety and tachycardia . She was treated with VERDUZCO ablation and she became hypothyroidism afterwards . She has been on Thyroid hormone replacement for years and previously saw Dr Rosanne Christian . She also tried [place] thyroid for a few years and found it hard to adjust medication. she was put back on Levoxyl and she was noted to have low T3 so she was started on Cytomel in 2019 ---she stopped taking it in December     current complaints: fatigue, weight gain, anxiousness  History of obstructive symptoms: difficulty swallowing No, changes in voice/hoarseness No.    History of radiation to patient's neck: NO  Recent iodine exposure: Yes  Family history includes hypothyroidism. Family history of thyroid cancer: No      Patient has been diagnosed with chronic pain syndrome, myofascial muscle pain, degenerative disc disease and osteoarthritis. Patient does follow with pain management group and is on tramadol. She tells me she became post menopausal at age 47 years , her parents had osteoporosis and her brother has celiac disease and osteoporosis too. She has been tested for celiac and she was negative. She doesn't take any calcium supplement.     INTERIM   She has been taking Fosamax weekly since October 2021 and tends to be regular but occasionally skips a week or 2 and usually delays it by 1 day at the most.       Past Medical History:   Diagnosis Date    Asthma     Seasonal allergies     Thyroid disease      Patient Active Problem List    Diagnosis Date Noted    Graves disease 06/16/2020    Postablative hypothyroidism 06/16/2020    Chronic pain syndrome 06/16/2020    Postmenopausal 06/16/2020     Past Surgical History: Procedure Laterality Date    COLONOSCOPY  12/19/2017    [place], normal    COLONOSCOPY N/A 12/4/2020    COLONOSCOPY DIAGNOSTIC performed by Reginaldo Rico MD at [address]      X 2    WISDOM TOOTH EXTRACTION       Family History   Problem Relation Age of Onset    High Blood Pressure Mother     Atrial Fibrillation Mother     High Blood Pressure Father     Cancer Father         URETER    Colon Cancer Paternal Grandfather      Social History     Socioeconomic History    Marital status:      Spouse name: None    Number of children: None    Years of education: None    Highest education level: None   Tobacco Use    Smoking status: Never    Smokeless tobacco: Never   Vaping Use    Vaping Use: Never used   Substance and Sexual Activity    Alcohol use: No    Drug use: Never    Sexual activity: Yes     Partners: Male     Current Outpatient Medications   Medication Sig Dispense Refill    LEVOXYL 112 MCG tablet TAKE ONE TABLET BY MOUTH DAILY 90 tablet 1    vitamin D (ERGOCALCIFEROL) 1.25 MG (80904 UT) CAPS capsule Take 1 capsule by mouth once a week 12 capsule 1    alendronate (FOSAMAX) 70 MG tablet TAKE 1 TABLET BY MOUTH ONCE WEEKLY ON AN EMPTY STOMACH BEFORE BREAKFAST. REMAIN UPRIGHT FOR 30 MINUTES AND TAKE WITH 8 OUNCES OF WATER 4 tablet 2    Cetirizine HCl (ZYRTEC PO) Take by mouth      traMADol (ULTRAM) 50 MG tablet Take 50 mg by mouth every 6 hours as needed for Pain . No current facility-administered medications for this visit.      Allergies   Allergen Reactions    Nsaids Shortness Of Breath    Triamcinolone Anaphylaxis     Allergic to preservative in [employer]             OBJECTIVE:   /78   Pulse 97   Resp 16   Ht 5' 6\" (1.676 m)   Wt 153 lb (69.4 kg)   BMI 24.69 kg/m²   Wt Readings from Last 3 Encounters:   01/19/23 153 lb (69.4 kg)   12/04/20 141 lb (64 kg)   06/16/20 144 lb (65.3 kg)     Weight 145 lbs   Constitutional: no acute distress, well appearing, well nourished  Psychiatric: oriented to person, place and time, judgement, insight and normal, recent and remote memory and intact and mood, affect are normal  Skin: skin and subcutaneous tissue is normal without mass,   Head and Face: examination of head and face revealed no abnormalities  Eyes: no lid or conjunctival swelling, no erythema or discharge, pupils are normal,   Ears/Nose: external inspection of ears and nose revealed no abnormalities, hearing is grossly normal  Oropharynx/Mouth/Face: lips, tongue and gums are normal with no lesions, the voice quality was normal  Neck: neck is supple and symmetric, with midline trachea and no masses, thyroid is normal    Pulmonary: no increased work of breathing or signs of respiratory distress, lungs are clear to auscultation  Cardiovascular: normal heart rate and rhythm, normal S1 and S2,   Musculoskeletal: normal gait and station,   Neurological: normal coordination, normal general cortical function        Lab Review:  Lab Results   Component Value Date/Time    TSH 0.73 01/06/2023 10:52 AM    TSH 14.40 10/23/2021 08:33 AM    TSH 2.52 11/19/2020 04:26 PM     Lab Results   Component Value Date/Time    T4FREE 1.3 01/06/2023 10:52 AM        ASSESSMENT/PLAN:    Osteoporosis new diagnosis   dexa scan oct 2021 T score LS -2.4 left hip -2.9   She had menopause at age 30 premature   She was advised to optimize her calcium to 1200 mg    Both her mom and dad had osteoporosis along with mom having hip replacement twice due to osteoarthritis  Fosamax 70 mg started in October 2021 she has been taking it almost regularly but we did discuss recast versus Fosamax in the hopes of better compliance she is not interested in Prolia injections. She was advised to optimize calcium intake and discussed all calcium sources in detail today along with vitamin D supplementation. Also advised to do weightbearing exercises.     --- Graves disease status post radioactive iodine ablation at age 25 by Dr. Sharon Manjarrez  Apparently she did not develop Graves' orbitopathy, she was told that she has goiter and Graves' disease  Patient was advised to have the thyroid function Test done in three months and then again in six months    --- Postablative hypothyroidism    Patient was noted to have a suppressed TSH in July 2020 and her dose  was reduced to 100 µg daily now TSH is 14, will change the dose back to 112 mcg daily and she will repeat the labs in 2 months    She was needing thyroid hormone replacement soon after her radioactive iodine ablation. Initially she was started on Levoxyl and was later on switched to [place] [facility] for 2 years and then switch back to Levoxyl and Cytomel. Since December 2019 she stopped taking the Cytomel took 112 mcg of Levoxyl. She did not notice significant change in her symptoms after stopping Cytomel and is not interested in resuming it.     Vitamin D deficiency level was 11.6  Will start on prescription strength vitamin D 50,000 international units weekly there is no history of kidney stones prescription called in for the patient will repeat labs at follow-up patient will be seen after her repeat DEXA scan in October 2023.    --- Chronic pain syndrome  Patient is being treated for chronic pain syndrome, she also carries the diagnosis of myofascial pain issues and follows with pain management physician and is on tramadol    ---- Postmenopausal  According to the patient her last menstrual cycle was at age 47  She notes some symptoms of menopause in the last 1 or 2 years  She has strong family history of osteoporosis including her mom dad and her brother who has celiac disease  I have ordered the following        Reviewed and/or ordered clinical lab results Yes  Reviewed and/or ordered radiology tests Yes   Reviewed and/or ordered other diagnostic tests Yes  Made a decision to obtain old records Yes  Reviewed and summarized old records Yes      Navi Purcell Gutierrez Rubio was counseled regarding symptoms of current diagnosis, course and complications of disease if inadequately treated, side effects of medications, diagnosis, treatment options, and prognosis, risks, benefits, complications, and alternatives of treatment, labs, imaging and other studies and treatment targets and goals. She understands instructions and counseling. Return in about 1 year (around 1/19/2024). Please note that some or all of this report was generated using voice recognition software. Please notify me in case of any questions about the content of this document, as some errors in transcription may have occurred .

## 2023-04-19 ENCOUNTER — TELEPHONE (OUTPATIENT)
Dept: ENDOCRINOLOGY | Age: 58
End: 2023-04-19

## 2023-04-19 DIAGNOSIS — M81.0 AGE-RELATED OSTEOPOROSIS WITHOUT CURRENT PATHOLOGICAL FRACTURE: ICD-10-CM

## 2023-04-19 RX ORDER — ALENDRONATE SODIUM 70 MG/1
TABLET ORAL
Qty: 4 TABLET | Refills: 3 | Status: SHIPPED | OUTPATIENT
Start: 2023-04-19

## 2023-07-12 ENCOUNTER — TELEPHONE (OUTPATIENT)
Dept: ENDOCRINOLOGY | Age: 58
End: 2023-07-12

## 2023-07-12 DIAGNOSIS — E55.9 VITAMIN D DEFICIENCY: ICD-10-CM

## 2023-07-12 DIAGNOSIS — M81.0 AGE-RELATED OSTEOPOROSIS WITHOUT CURRENT PATHOLOGICAL FRACTURE: Primary | ICD-10-CM

## 2023-07-12 RX ORDER — ERGOCALCIFEROL 1.25 MG/1
50000 CAPSULE ORAL WEEKLY
Qty: 12 CAPSULE | Refills: 1 | Status: SHIPPED | OUTPATIENT
Start: 2023-07-12

## 2023-07-12 NOTE — TELEPHONE ENCOUNTER
Fax from [facility] request for Vitamin D2  50,000 IU Cap    LOV  1-19-23  FOV   11-16-23

## 2023-09-12 ENCOUNTER — TELEPHONE (OUTPATIENT)
Dept: ENDOCRINOLOGY | Age: 58
End: 2023-09-12

## 2023-09-12 DIAGNOSIS — E89.0 POSTABLATIVE HYPOTHYROIDISM: ICD-10-CM

## 2023-09-12 RX ORDER — LEVOTHYROXINE SODIUM 112 UG/1
TABLET ORAL
Qty: 90 TABLET | Refills: 0 | Status: SHIPPED | OUTPATIENT
Start: 2023-09-12

## 2023-11-08 ENCOUNTER — TELEPHONE (OUTPATIENT)
Dept: ENDOCRINOLOGY | Age: 58
End: 2023-11-08

## 2023-11-08 NOTE — TELEPHONE ENCOUNTER
Fax from [facility] request for Levothyroxine 112mcg tab    LOV   1-19-23  FOV  11-16-23

## 2023-11-16 ENCOUNTER — OFFICE VISIT (OUTPATIENT)
Dept: ENDOCRINOLOGY | Age: 58
End: 2023-11-16
Payer: COMMERCIAL

## 2023-11-16 VITALS
HEART RATE: 89 BPM | SYSTOLIC BLOOD PRESSURE: 132 MMHG | DIASTOLIC BLOOD PRESSURE: 78 MMHG | BODY MASS INDEX: 23.78 KG/M2 | WEIGHT: 148 LBS | HEIGHT: 66 IN | RESPIRATION RATE: 14 BRPM | TEMPERATURE: 98 F

## 2023-11-16 DIAGNOSIS — E05.00 GRAVES DISEASE: ICD-10-CM

## 2023-11-16 DIAGNOSIS — E89.0 POSTABLATIVE HYPOTHYROIDISM: Primary | ICD-10-CM

## 2023-11-16 DIAGNOSIS — G89.4 CHRONIC PAIN SYNDROME: ICD-10-CM

## 2023-11-16 DIAGNOSIS — Z78.0 POSTMENOPAUSAL: ICD-10-CM

## 2023-11-16 PROCEDURE — 99214 OFFICE O/P EST MOD 30 MIN: CPT | Performed by: INTERNAL MEDICINE

## 2023-11-16 RX ORDER — LEVOTHYROXINE SODIUM 112 UG/1
TABLET ORAL
Qty: 90 TABLET | Refills: 1 | Status: SHIPPED | OUTPATIENT
Start: 2023-11-16

## 2023-11-16 NOTE — PROGRESS NOTES
course and complications of disease if inadequately treated, side effects of medications, diagnosis, treatment options, and prognosis, risks, benefits, complications, and alternatives of treatment, labs, imaging and other studies and treatment targets and goals. She understands instructions and counseling. Return in about 6 months (around 5/16/2024). Please note that some or all of this report was generated using voice recognition software. Please notify me in case of any questions about the content of this document, as some errors in transcription may have occurred .

## 2024-01-05 ENCOUNTER — TELEPHONE (OUTPATIENT)
Dept: ENDOCRINOLOGY | Age: 59
End: 2024-01-05

## 2024-01-05 DIAGNOSIS — E55.9 VITAMIN D DEFICIENCY: ICD-10-CM

## 2024-01-05 RX ORDER — ERGOCALCIFEROL 1.25 MG/1
50000 CAPSULE ORAL WEEKLY
Qty: 12 CAPSULE | Refills: 1 | Status: SHIPPED | OUTPATIENT
Start: 2024-01-05

## 2024-01-05 NOTE — TELEPHONE ENCOUNTER
Requested Prescriptions     Signed Prescriptions Disp Refills    vitamin D (ERGOCALCIFEROL) 1.25 MG (78913 UT) CAPS capsule 12 capsule 1     Sig: TAKE 1 CAPSULE BY MOUTH 1 TIME A WEEK     Authorizing Provider: DHRUV URIAS     Ordering User: AROLDO JORDAN

## 2024-01-09 NOTE — TELEPHONE ENCOUNTER
Please advise patient that I reviewed her DEXA scan results and it still shows osteoporosis with small increase in the bone mineral density, at the office visit patient was concerned about compliance with Fosamax weekly and was interested in switching to once yearly Reclast infusion if she is still interested I can go ahead and prescribe that and in that case she does not have to take Fosamax weekly and will go to infusion center once a year for Reclast infusion.

## 2024-01-11 NOTE — TELEPHONE ENCOUNTER
Patient informed. She would like to stay on Fosamax and try to increase her calcium intake.     Patient would like clarification on the statement in her results that states \"there is a statistically significant change as follows: there has been a change of 0.053 g/cm2 or a 6.8% increase in lumbar spine.\"  Is this an increase in bone mass or an increase in bone loss?    Patient also asking about weight bearing exercises. Light hand weights? Is there a max weight?

## 2024-01-15 NOTE — TELEPHONE ENCOUNTER
Please advise patient that the DEXA scan showed improvement in bone mineral density.  Weightbearing exercises like walking dancing, low impact aerobics, elliptical training machines stair climbing and gardening these types of exercises worked directly on bones in the legs hips and lower spine to slow bone loss.  Do not lift more than 20 to 25 pounds with your arms or against your trunk and avoid movement that have you twisting your trunk or bending forward extensively.

## 2024-03-26 ENCOUNTER — TELEPHONE (OUTPATIENT)
Dept: ENDOCRINOLOGY | Age: 59
End: 2024-03-26

## 2024-03-26 DIAGNOSIS — M81.0 AGE-RELATED OSTEOPOROSIS WITHOUT CURRENT PATHOLOGICAL FRACTURE: ICD-10-CM

## 2024-03-26 RX ORDER — ALENDRONATE SODIUM 70 MG/1
TABLET ORAL
Qty: 4 TABLET | Refills: 5 | Status: SHIPPED | OUTPATIENT
Start: 2024-03-26

## 2024-03-26 NOTE — TELEPHONE ENCOUNTER
Fax from [facility] on [address]  w/ advance refill approval request for Alendronate 70mg tab    There are zero refills remaining on this rx. Your pt is requesting advance approval of refills for this medication to prevent any missed doses    LOV    11-16-23  FOV    11-14-24

## 2024-05-28 ENCOUNTER — TELEPHONE (OUTPATIENT)
Dept: ENDOCRINOLOGY | Age: 59
End: 2024-05-28

## 2024-05-28 DIAGNOSIS — E89.0 POSTABLATIVE HYPOTHYROIDISM: ICD-10-CM

## 2024-05-28 RX ORDER — LEVOTHYROXINE SODIUM 112 UG/1
TABLET ORAL
Qty: 90 TABLET | Refills: 1 | Status: SHIPPED | OUTPATIENT
Start: 2024-05-28

## 2024-06-24 ENCOUNTER — TELEPHONE (OUTPATIENT)
Dept: ENDOCRINOLOGY | Age: 59
End: 2024-06-24

## 2024-06-24 DIAGNOSIS — E55.9 VITAMIN D DEFICIENCY: ICD-10-CM

## 2024-06-24 RX ORDER — ERGOCALCIFEROL 1.25 MG/1
50000 CAPSULE ORAL WEEKLY
Qty: 12 CAPSULE | Refills: 1 | Status: SHIPPED | OUTPATIENT
Start: 2024-06-24

## 2024-08-07 ENCOUNTER — TELEPHONE (OUTPATIENT)
Dept: ENDOCRINOLOGY | Age: 59
End: 2024-08-07

## 2024-08-07 DIAGNOSIS — E05.00 GRAVES DISEASE: ICD-10-CM

## 2024-08-07 DIAGNOSIS — G89.4 CHRONIC PAIN SYNDROME: ICD-10-CM

## 2024-08-07 DIAGNOSIS — Z78.0 POSTMENOPAUSAL: ICD-10-CM

## 2024-08-07 DIAGNOSIS — E89.0 POSTABLATIVE HYPOTHYROIDISM: ICD-10-CM

## 2024-08-07 LAB
25(OH)D3 SERPL-MCNC: 57.5 NG/ML
ALBUMIN SERPL-MCNC: 4.5 G/DL (ref 3.4–5)
ALBUMIN/GLOB SERPL: 1.8 {RATIO} (ref 1.1–2.2)
ALP SERPL-CCNC: 82 U/L (ref 40–129)
ALT SERPL-CCNC: 11 U/L (ref 10–40)
ANION GAP SERPL CALCULATED.3IONS-SCNC: 9 MMOL/L (ref 3–16)
AST SERPL-CCNC: 20 U/L (ref 15–37)
BILIRUB SERPL-MCNC: 0.5 MG/DL (ref 0–1)
BUN SERPL-MCNC: 11 MG/DL (ref 7–20)
CALCIUM SERPL-MCNC: 9.8 MG/DL (ref 8.3–10.6)
CHLORIDE SERPL-SCNC: 103 MMOL/L (ref 99–110)
CO2 SERPL-SCNC: 28 MMOL/L (ref 21–32)
CREAT SERPL-MCNC: 0.8 MG/DL (ref 0.6–1.1)
GFR SERPLBLD CREATININE-BSD FMLA CKD-EPI: 86 ML/MIN/{1.73_M2}
GLUCOSE SERPL-MCNC: 111 MG/DL (ref 70–99)
PHOSPHATE SERPL-MCNC: 2.5 MG/DL (ref 2.5–4.9)
POTASSIUM SERPL-SCNC: 4.5 MMOL/L (ref 3.5–5.1)
PROT SERPL-MCNC: 7 G/DL (ref 6.4–8.2)
PTH-INTACT SERPL-MCNC: 80.1 PG/ML (ref 14–72)
SODIUM SERPL-SCNC: 140 MMOL/L (ref 136–145)
T4 FREE SERPL-MCNC: 1.4 NG/DL (ref 0.9–1.8)
TSH SERPL DL<=0.005 MIU/L-ACNC: 0.22 UIU/ML (ref 0.27–4.2)

## 2024-08-07 NOTE — TELEPHONE ENCOUNTER
Pt calling and she wants to get her thyroid labs because she has hair loss.    She will need her lab orders updated for the expected date

## 2024-08-08 NOTE — TELEPHONE ENCOUNTER
Please advise patient that the lab results show that her TSH is slightly suppressed which means taking too much thyroid medication so I would recommend taking half a tablet on Saturday and Sunday and stay on the same dose rest of the days  Vitamin D was fine PTH was slightly elevated which controls the calcium metabolism in the body but since her calcium level is fine no further action is needed but a repeat calcium can be done at follow-up  Patient can let us know about recommendations from dermatology.

## 2024-08-08 NOTE — TELEPHONE ENCOUNTER
Pt calling and states her PTH intact lab came back out of range. She does have appt alexandro teixeira/ dermatology to discuss her hair loss. Pt states but she's been having other symptoms such as nervousness, hot flashes, went thru menopause @ 40yrs old so not sure why she would be having hot flashes again    CB# [phone]

## 2024-08-22 ENCOUNTER — PATIENT MESSAGE (OUTPATIENT)
Dept: ENDOCRINOLOGY | Age: 59
End: 2024-08-22

## 2024-08-22 DIAGNOSIS — E89.0 POSTABLATIVE HYPOTHYROIDISM: Primary | ICD-10-CM

## 2024-08-22 DIAGNOSIS — M81.0 AGE-RELATED OSTEOPOROSIS WITHOUT CURRENT PATHOLOGICAL FRACTURE: ICD-10-CM

## 2024-09-17 DIAGNOSIS — M81.0 AGE-RELATED OSTEOPOROSIS WITHOUT CURRENT PATHOLOGICAL FRACTURE: ICD-10-CM

## 2024-09-17 RX ORDER — ALENDRONATE SODIUM 70 MG/1
TABLET ORAL
Qty: 4 TABLET | Refills: 1 | Status: SHIPPED | OUTPATIENT
Start: 2024-09-17

## 2024-10-25 DIAGNOSIS — M81.0 AGE-RELATED OSTEOPOROSIS WITHOUT CURRENT PATHOLOGICAL FRACTURE: ICD-10-CM

## 2024-10-25 RX ORDER — ALENDRONATE SODIUM 70 MG/1
TABLET ORAL
Qty: 4 TABLET | Refills: 1 | Status: SHIPPED | OUTPATIENT
Start: 2024-10-25

## 2024-11-07 ENCOUNTER — TELEPHONE (OUTPATIENT)
Dept: ENDOCRINOLOGY | Age: 59
End: 2024-11-07

## 2024-11-07 DIAGNOSIS — G89.4 CHRONIC PAIN SYNDROME: ICD-10-CM

## 2024-11-07 DIAGNOSIS — Z78.0 POSTMENOPAUSAL: ICD-10-CM

## 2024-11-07 DIAGNOSIS — E05.00 GRAVES DISEASE: ICD-10-CM

## 2024-11-07 DIAGNOSIS — E89.0 POSTABLATIVE HYPOTHYROIDISM: ICD-10-CM

## 2024-11-07 DIAGNOSIS — M81.0 AGE-RELATED OSTEOPOROSIS WITHOUT CURRENT PATHOLOGICAL FRACTURE: ICD-10-CM

## 2024-11-07 LAB
25(OH)D3 SERPL-MCNC: 54.9 NG/ML
ALBUMIN SERPL-MCNC: 4.4 G/DL (ref 3.4–5)
ALBUMIN/GLOB SERPL: 2.1 {RATIO} (ref 1.1–2.2)
ALP SERPL-CCNC: 65 U/L (ref 40–129)
ALT SERPL-CCNC: 10 U/L (ref 10–40)
ANION GAP SERPL CALCULATED.3IONS-SCNC: 9 MMOL/L (ref 3–16)
AST SERPL-CCNC: 20 U/L (ref 15–37)
BILIRUB SERPL-MCNC: 0.4 MG/DL (ref 0–1)
BUN SERPL-MCNC: 14 MG/DL (ref 7–20)
CALCIUM SERPL-MCNC: 9.8 MG/DL (ref 8.3–10.6)
CHLORIDE SERPL-SCNC: 105 MMOL/L (ref 99–110)
CO2 SERPL-SCNC: 26 MMOL/L (ref 21–32)
CREAT SERPL-MCNC: 0.7 MG/DL (ref 0.6–1.1)
GFR SERPLBLD CREATININE-BSD FMLA CKD-EPI: >90 ML/MIN/{1.73_M2}
GLUCOSE SERPL-MCNC: 92 MG/DL (ref 70–99)
PHOSPHATE SERPL-MCNC: 2.3 MG/DL (ref 2.5–4.9)
POTASSIUM SERPL-SCNC: 4 MMOL/L (ref 3.5–5.1)
PROT SERPL-MCNC: 6.5 G/DL (ref 6.4–8.2)
PTH-INTACT SERPL-MCNC: 64.5 PG/ML (ref 14–72)
SODIUM SERPL-SCNC: 140 MMOL/L (ref 136–145)
T4 FREE SERPL-MCNC: 1.5 NG/DL (ref 0.9–1.8)
TSH SERPL DL<=0.005 MIU/L-ACNC: 0.5 UIU/ML (ref 0.27–4.2)

## 2024-11-07 NOTE — TELEPHONE ENCOUNTER
Call from pt stating that she had her BW done today  She stated that the lab was unable to do lab order NTX telopeptide urine. She stated that they never seen that test before and wasn't sure if it was supposed to be a 24 hr urine. If not, then that is something she would have to have done at a therapy office     Pt is wanting to know if Dr. Whittaker would like for her to have a urine test done or not?    Please advise   CB# [phone]

## 2024-11-09 LAB — COLLAGEN CTX SERPL-MCNC: 113 PG/ML

## 2024-11-14 ENCOUNTER — OFFICE VISIT (OUTPATIENT)
Dept: ENDOCRINOLOGY | Age: 59
End: 2024-11-14
Payer: COMMERCIAL

## 2024-11-14 VITALS
TEMPERATURE: 98 F | SYSTOLIC BLOOD PRESSURE: 115 MMHG | HEART RATE: 102 BPM | DIASTOLIC BLOOD PRESSURE: 82 MMHG | WEIGHT: 151 LBS | HEIGHT: 66 IN | BODY MASS INDEX: 24.27 KG/M2 | RESPIRATION RATE: 14 BRPM

## 2024-11-14 DIAGNOSIS — M81.0 AGE-RELATED OSTEOPOROSIS WITHOUT CURRENT PATHOLOGICAL FRACTURE: Primary | ICD-10-CM

## 2024-11-14 DIAGNOSIS — E89.0 POSTABLATIVE HYPOTHYROIDISM: ICD-10-CM

## 2024-11-14 DIAGNOSIS — E55.9 VITAMIN D DEFICIENCY: ICD-10-CM

## 2024-11-14 DIAGNOSIS — E05.00 GRAVES DISEASE: ICD-10-CM

## 2024-11-14 PROCEDURE — 99214 OFFICE O/P EST MOD 30 MIN: CPT | Performed by: INTERNAL MEDICINE

## 2024-11-14 RX ORDER — LEVOTHYROXINE SODIUM 88 UG/1
88 TABLET ORAL DAILY
Qty: 90 TABLET | Refills: 3 | Status: SHIPPED | OUTPATIENT
Start: 2024-11-14

## 2024-11-14 RX ORDER — CLOBETASOL PROPIONATE 0.5 MG/ML
SOLUTION TOPICAL
COMMUNITY
Start: 2024-11-07

## 2024-11-14 RX ORDER — ERGOCALCIFEROL 1.25 MG/1
50000 CAPSULE, LIQUID FILLED ORAL WEEKLY
Qty: 12 CAPSULE | Refills: 1 | Status: SHIPPED | OUTPATIENT
Start: 2024-11-14

## 2024-11-14 RX ORDER — ALENDRONATE SODIUM 70 MG/1
TABLET ORAL
Qty: 12 TABLET | Refills: 1 | Status: SHIPPED | OUTPATIENT
Start: 2024-11-14

## 2024-11-14 RX ORDER — BACLOFEN 10 MG/1
10 TABLET ORAL 3 TIMES DAILY
COMMUNITY
Start: 2024-09-04

## 2024-11-14 RX ORDER — CELECOXIB 100 MG/1
100 CAPSULE ORAL 2 TIMES DAILY
COMMUNITY
Start: 2024-09-04

## 2024-11-14 NOTE — PROGRESS NOTES
Surgical History:   Procedure Laterality Date    COLONOSCOPY  12/19/2017    [place], normal    COLONOSCOPY N/A 12/4/2020    COLONOSCOPY DIAGNOSTIC performed by Floyd Fermin MD at [facility] ENDOSCOPY    DILATION AND CURETTAGE OF UTERUS      SINUS SURGERY      X 2    WISDOM TOOTH EXTRACTION       Family History   Problem Relation Age of Onset    High Blood Pressure Mother     Atrial Fibrillation Mother     High Blood Pressure Father     Cancer Father         URETER    Colon Cancer Paternal Grandfather      Social History     Socioeconomic History    Marital status:      Spouse name: None    Number of children: None    Years of education: None    Highest education level: None   Tobacco Use    Smoking status: Never    Smokeless tobacco: Never   Vaping Use    Vaping status: Never Used   Substance and Sexual Activity    Alcohol use: No    Drug use: Never    Sexual activity: Yes     Partners: Male     Social Determinants of Health      Received from [facility]    Food Insecurities    Received from [facility]    Transportation    Received from [facility]    Interpersonal Safety    Received from [facility]    Housing/Utilities     Current Outpatient Medications   Medication Sig Dispense Refill    baclofen (LIORESAL) 10 MG tablet Take 1 tablet by mouth 3 times daily      celecoxib (CELEBREX) 100 MG capsule Take 1 capsule by mouth 2 times daily      clobetasol (TEMOVATE) 0.05 % external solution SMARTSIG:Topical Twice Daily      alendronate (FOSAMAX) 70 MG tablet TAKE 1 TABLET BY MOUTH 1 TIME WEEKLY BEFORE BREAKFAST ON AN EMPTY STOMACH. REMAIN UPRIGHT FOR 30 MINUTES. TAKE WITH 8 OUNCES OF WATER 12 tablet 1    vitamin D (ERGOCALCIFEROL) 1.25 MG (32867 UT) CAPS capsule Take 1 capsule by mouth Once a week at 5 PM 12 capsule 1    LEVOXYL 88 MCG tablet Take 1 tablet by mouth Daily 90 tablet 3

## 2024-11-19 DIAGNOSIS — E55.9 VITAMIN D DEFICIENCY: ICD-10-CM

## 2024-11-19 RX ORDER — ERGOCALCIFEROL 1.25 MG/1
CAPSULE, LIQUID FILLED ORAL
Qty: 12 CAPSULE | Refills: 1 | Status: SHIPPED | OUTPATIENT
Start: 2024-11-19

## 2024-12-30 DIAGNOSIS — M81.0 AGE-RELATED OSTEOPOROSIS WITHOUT CURRENT PATHOLOGICAL FRACTURE: ICD-10-CM

## 2024-12-30 RX ORDER — ALENDRONATE SODIUM 70 MG/1
TABLET ORAL
Qty: 12 TABLET | Refills: 1 | Status: SHIPPED | OUTPATIENT
Start: 2024-12-30

## 2024-12-30 NOTE — TELEPHONE ENCOUNTER
Medication:   Requested Prescriptions     Pending Prescriptions Disp Refills    alendronate (FOSAMAX) 70 MG tablet [Pharmacy Med Name: ALENDRONATE 70MG TABLETS] 4 tablet      Sig: TAKE 1 TABLET BY MOUTH 1 TIME WEEKLY BEFORE BREAKFAST ON AN EMPTY STOMACH. REMAIN UPRIGHT FOR 30 MINUTES. TAKE WITH 8 OUNCES OF WATER       Last Filled:      Patient Phone Number: [phone] (home)     Last appt: 11/14/2024   Next appt: 5/14/2025    Last Labs DM:   Lab Results   Component Value Date/Time    VITD25 54.9 11/07/2024 02:23 PM    VITD25 57.5 08/07/2024 12:19 PM

## 2025-05-07 DIAGNOSIS — E55.9 VITAMIN D DEFICIENCY: ICD-10-CM

## 2025-05-07 DIAGNOSIS — M81.0 AGE-RELATED OSTEOPOROSIS WITHOUT CURRENT PATHOLOGICAL FRACTURE: ICD-10-CM

## 2025-05-08 LAB
T4 FREE SERPL-MCNC: 1.4 NG/DL (ref 0.9–1.8)
TSH SERPL DL<=0.005 MIU/L-ACNC: 0.87 UIU/ML (ref 0.27–4.2)

## 2025-05-14 ENCOUNTER — OFFICE VISIT (OUTPATIENT)
Dept: ENDOCRINOLOGY | Age: 60
End: 2025-05-14
Payer: COMMERCIAL

## 2025-05-14 VITALS
OXYGEN SATURATION: 98 % | HEIGHT: 66 IN | BODY MASS INDEX: 23.46 KG/M2 | HEART RATE: 72 BPM | RESPIRATION RATE: 16 BRPM | TEMPERATURE: 98 F | WEIGHT: 146 LBS | SYSTOLIC BLOOD PRESSURE: 126 MMHG | DIASTOLIC BLOOD PRESSURE: 81 MMHG

## 2025-05-14 DIAGNOSIS — E55.9 VITAMIN D DEFICIENCY: ICD-10-CM

## 2025-05-14 DIAGNOSIS — M81.0 AGE-RELATED OSTEOPOROSIS WITHOUT CURRENT PATHOLOGICAL FRACTURE: ICD-10-CM

## 2025-05-14 DIAGNOSIS — E89.0 POSTABLATIVE HYPOTHYROIDISM: Primary | ICD-10-CM

## 2025-05-14 PROCEDURE — 99214 OFFICE O/P EST MOD 30 MIN: CPT | Performed by: INTERNAL MEDICINE

## 2025-05-14 RX ORDER — ALENDRONATE SODIUM 70 MG/1
TABLET ORAL
Qty: 12 TABLET | Refills: 1 | Status: SHIPPED | OUTPATIENT
Start: 2025-05-14

## 2025-05-14 RX ORDER — ERGOCALCIFEROL 1.25 MG/1
50000 CAPSULE, LIQUID FILLED ORAL WEEKLY
Qty: 12 CAPSULE | Refills: 1 | Status: SHIPPED | OUTPATIENT
Start: 2025-05-14

## 2025-05-14 RX ORDER — LEVOTHYROXINE SODIUM 88 UG/1
88 TABLET ORAL DAILY
Qty: 90 TABLET | Refills: 1 | Status: SHIPPED | OUTPATIENT
Start: 2025-05-14

## 2025-05-14 NOTE — PROGRESS NOTES
SUBJECTIVE:  Vaishali Madera is a 60 y.o. female who is seen here for post ablative hypothyroidism.  Patient was diagnosed with Graves' disease and was treated with radioactive iodine in 1986   Patient was diagnosed with Graves  approximately at age 19.  Symptoms at presentation were weight loss , goiter and anxiety and tachycardia . She was treated with VERDUZCO ablation and she became hypothyroidism afterwards . She has been on Thyroid hormone replacement for years and previously saw Dr Rowell . She also tried [place] thyroid for a few years and found it hard to adjust medication.she was put back on Levoxyl and she was noted to have low T3 so she was started on Cytomel in 2019 ---she stopped taking it in December     current complaints: fatigue, weight gain, anxiousness  History of obstructive symptoms: difficulty swallowing No, changes in voice/hoarseness No.    History of radiation to patient's neck: NO  Recent iodine exposure: Yes  Family history includes hypothyroidism.  Family history of thyroid cancer: No      Patient has been diagnosed with chronic pain syndrome, myofascial muscle pain, degenerative disc disease and osteoarthritis.  Patient does follow with pain management group and is on tramadol.    She tells me she became post menopausal at age 40 years , her parents had osteoporosis and her brother has celiac disease and osteoporosis too. She has been tested for celiac and she was negative.   She doesn't take any calcium supplement.    INTERIM   She has been taking Fosamax weekly since October 2021 and tends to be regular    denies any falls or fractures  Continues to take her thyroid medication 112 mcg empty stomach       Past Medical History:   Diagnosis Date    Asthma     Seasonal allergies     Thyroid disease      Patient Active Problem List    Diagnosis Date Noted    Graves disease 06/16/2020    Postablative hypothyroidism 06/16/2020    Chronic pain syndrome 06/16/2020    Postmenopausal 06/16/2020     Past